# Patient Record
Sex: MALE | Race: WHITE
[De-identification: names, ages, dates, MRNs, and addresses within clinical notes are randomized per-mention and may not be internally consistent; named-entity substitution may affect disease eponyms.]

---

## 2017-03-06 ENCOUNTER — HOSPITAL ENCOUNTER (EMERGENCY)
Dept: HOSPITAL 10 - E/R | Age: 82
LOS: 1 days | Discharge: LEFT BEFORE DISCHARGE | End: 2017-03-07
Payer: MEDICARE

## 2017-03-06 VITALS
HEIGHT: 68 IN | HEIGHT: 68 IN | WEIGHT: 155.32 LBS | BODY MASS INDEX: 23.54 KG/M2 | BODY MASS INDEX: 23.54 KG/M2 | WEIGHT: 155.32 LBS

## 2017-03-06 DIAGNOSIS — T07: ICD-10-CM

## 2017-03-06 DIAGNOSIS — R41.82: ICD-10-CM
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
## 2017-03-06 DIAGNOSIS — I48.91: ICD-10-CM

## 2017-03-06 DIAGNOSIS — R53.1: Primary | ICD-10-CM

## 2017-03-06 LAB
ADD SCAN DIFF: NO
ALBUMIN SERPL-MCNC: 4.7 G/DL (ref 3.3–4.9)
ALBUMIN/GLOB SERPL: 1.27 {RATIO}
ALP SERPL-CCNC: 74 IU/L (ref 42–121)
ALT SERPL-CCNC: 63 IU/L (ref 13–69)
ANION GAP SERPL CALC-SCNC: 20 MMOL/L (ref 8–16)
AST SERPL-CCNC: 72 IU/L (ref 15–46)
BASOPHILS # BLD AUTO: 0 10^3/UL (ref 0–0.1)
BASOPHILS NFR BLD: 0.4 % (ref 0–2)
BILIRUB DIRECT SERPL-MCNC: 0 MG/DL (ref 0–0.2)
BILIRUB SERPL-MCNC: 0.4 MG/DL (ref 0.2–1.3)
BUN SERPL-MCNC: 21 MG/DL (ref 7–20)
CALCIUM SERPL-MCNC: 9.6 MG/DL (ref 8.4–10.2)
CHLORIDE SERPL-SCNC: 99 MMOL/L (ref 97–110)
CO2 SERPL-SCNC: 25 MMOL/L (ref 21–31)
CREAT SERPL-MCNC: 1.01 MG/DL (ref 0.61–1.24)
EOSINOPHIL # BLD: 0.1 10^3/UL (ref 0–0.5)
EOSINOPHIL NFR BLD: 1.2 % (ref 0–7)
ERYTHROCYTE [DISTWIDTH] IN BLOOD BY AUTOMATED COUNT: 12.8 % (ref 11.5–14.5)
GLOBULIN SER-MCNC: 3.7 G/DL (ref 1.3–3.2)
GLUCOSE SERPL-MCNC: 134 MG/DL (ref 70–220)
HCT VFR BLD CALC: 50.1 % (ref 42–52)
HGB BLD-MCNC: 16.7 G/DL (ref 14–18)
LYMPHOCYTES # BLD AUTO: 0.7 10^3/UL (ref 0.8–2.9)
LYMPHOCYTES NFR BLD AUTO: 8.8 % (ref 15–51)
MCH RBC QN AUTO: 31.3 PG (ref 29–33)
MCHC RBC AUTO-ENTMCNC: 33.3 G/DL (ref 32–37)
MCV RBC AUTO: 94 FL (ref 82–101)
MONOCYTES # BLD: 1.1 10^3/UL (ref 0.3–0.9)
MONOCYTES NFR BLD: 12.7 % (ref 0–11)
NEUTROPHILS # BLD: 6.4 10^3/UL (ref 1.6–7.5)
NEUTROPHILS NFR BLD AUTO: 76.4 % (ref 39–77)
NRBC # BLD MANUAL: 0 10^3/UL (ref 0–0)
NRBC BLD QL: 0 /100WBC (ref 0–0)
PLATELET # BLD: 186 10^3/UL (ref 140–415)
PMV BLD AUTO: 9.6 FL (ref 7.4–10.4)
POTASSIUM SERPL-SCNC: 4.5 MMOL/L (ref 3.5–5.1)
PROT SERPL-MCNC: 8.4 G/DL (ref 6.1–8.1)
RBC # BLD AUTO: 5.33 10^6/UL (ref 4.7–6.1)
SODIUM SERPL-SCNC: 139 MMOL/L (ref 135–144)
TROPONIN I SERPL-MCNC: 0.01 NG/ML (ref 0–0.12)
WBC # BLD AUTO: 8.3 10^3/UL (ref 4.8–10.8)

## 2017-03-06 PROCEDURE — 84484 ASSAY OF TROPONIN QUANT: CPT

## 2017-03-06 PROCEDURE — 80053 COMPREHEN METABOLIC PANEL: CPT

## 2017-03-06 PROCEDURE — 93005 ELECTROCARDIOGRAM TRACING: CPT

## 2017-03-06 PROCEDURE — 71010: CPT

## 2017-03-06 PROCEDURE — 36415 COLL VENOUS BLD VENIPUNCTURE: CPT

## 2017-03-06 PROCEDURE — 83690 ASSAY OF LIPASE: CPT

## 2017-03-06 PROCEDURE — 70450 CT HEAD/BRAIN W/O DYE: CPT

## 2017-03-06 PROCEDURE — 82962 GLUCOSE BLOOD TEST: CPT

## 2017-03-06 PROCEDURE — 85025 COMPLETE CBC W/AUTO DIFF WBC: CPT

## 2017-03-06 NOTE — ERD
ER Documentation


Chief Complaint


Date/Time


DATE: 3/6/17 


TIME: 23:58


Chief Complaint


s/p fall X3 within 24-hour period,leaning towards left side,denies HA





HPI


This is an 82-year-old male has fallen 3 times the past 24 hours secondary to 

weakness.  Patient denies any headache or any focal neurologic complaints.  

Patient has history of old strokes in the past.  Denies any fevers or chills.  

Denies any chest pain.  Denies any other current issues.





ROS


All systems reviewed and are negative except as per history of present illness.





Medications


Home Meds


Reported Medications


[Ultra Supplements]   No Conflict Check, 1 TAB PO DAILY


   3/6/17


Cholecalciferol* (Vitamin D3*) 1,000 Unit Tablet, 1000 UNIT PO DAILY, TAB


   3/6/17


Mirabegron (Myrbetriq) 50 Mg Tab.er.24h, 25 MG PO DAILY, TAB


   3/6/17


Herbal Drugs (Colon Herbal Cleanser) 1 Each Capsule, 1 EACH PO DAILY, CAP


   3/6/17


Aspirin* (Aspirin* EC) 81 Mg Tablet.dr, 81 MG PO DAILY, TAB


   3/6/17


Celecoxib* (Celebrex*) 100 Mg Capsule, 100 MG PO BID, CAP


   3/6/17


Ezetimibe/Simvastatin (Vytorin 10-20 mg Tablet) 1 Each Tablet, 1 EACH PO DAILY, 

TAB


   3/6/17


Amlodipine Besylate* (Amlodipine Besylate*) 10 Mg Tablet, 10 MG PO DAILY, #30 

TAB


   3/6/17


Metoprolol Succinate* (Toprol XL*) 100 Mg Tab.sr.24h, 100 MG PO DAILY, #30 TAB


   3/6/17





Allergies


Allergies:  


Coded Allergies:  


     No Known Allergy (Unverified , 3/6/17)





PMhx/Soc


History of Surgery:  Yes (patient states about 10 surgeries, cant recall what)


Anesthesia Reaction:  No


Hx Neurological Disorder:  Yes (stroke)


Hx Respiratory Disorders:  No


Hx Psychiatric Problems:  No


Hx Miscellaneous Medical Probl:  Yes (prostate issues)


Hx Alcohol Use:  No


Hx Substance Use:  No


Hx Tobacco Use:  No


Smoking Status:  Never smoker





Physical Exam


Vitals





Vital Signs








  Date Time  Temp Pulse Resp B/P Pulse Ox O2 Delivery O2 Flow Rate FiO2


 


3/6/17 21:26  86 25 147/93 96 Room Air  


 


3/6/17 21:17 98.2 86 21 147/93 97   








Physical Exam


Const:  []


Head:   Atraumatic 


Eyes:    Normal Conjunctiva


ENT:    Normal External Ears, Nose and Mouth.


Neck:               Full range of motion..~ No meningismus.


Resp:    Clear to auscultation bilaterally


Cardio:    Regular rate and rhythm, no murmurs


Abd:    Soft, non tender, non distended. Normal bowel sounds


Skin:    No petechiae or rashes


Back:    No midline or flank tenderness


Ext:    No cyanosis, or edema


Neur:    Awake and alert


Psych:    Normal Mood and Affect


Result Diagram:  


3/6/17 2145                                                                    

            3/6/17 2145





Results 24 hrs





 Laboratory Tests








Test


  3/6/17


21:15 3/6/17


21:45


 


Bedside Glucose 137mg/dL  


 


Alanine Aminotransferase


(ALT/SGPT) 


  63IU/L 


 


 


Albumin  4.7g/dl 


 


Albumin/Globulin Ratio  1.27 


 


Alkaline Phosphatase  74IU/L 


 


Anion Gap  20 


 


Aspartate Amino Transf


(AST/SGOT) 


  72IU/L 


 


 


Basophils #  0.010^3/ul 


 


Basophils %  0.4% 


 


Blood Urea Nitrogen  21mg/dl 


 


Calcium Level  9.6mg/dl 


 


Carbon Dioxide Level  25mmol/L 


 


Chloride Level  99mmol/L 


 


Creatinine  1.01mg/dl 


 


Direct Bilirubin  0.00mg/dl 


 


Eosinophils #  0.110^3/ul 


 


Eosinophils %  1.2% 


 


Globulin  3.70g/dl 


 


Glucose Level  134mg/dl 


 


Hematocrit  50.1% 


 


Hemoglobin  16.7g/dl 


 


Indirect Bilirubin  0.4mg/dl 


 


Lipase  49U/L 


 


Lymphocytes #  0.710^3/ul 


 


Lymphocytes %  8.8% 


 


Mean Corpuscular Hemoglobin  31.3pg 


 


Mean Corpuscular Hemoglobin


Concent 


  33.3g/dl 


 


 


Mean Corpuscular Volume  94.0fl 


 


Mean Platelet Volume  9.6fl 


 


Monocytes #  1.110^3/ul 


 


Monocytes %  12.7% 


 


Neutrophils #  6.410^3/ul 


 


Neutrophils %  76.4% 


 


Nucleated Red Blood Cells #  0.010^3/ul 


 


Nucleated Red Blood Cells %  0.0/100WBC 


 


Platelet Count  49243^3/UL 


 


Potassium Level  4.5mmol/L 


 


Red Blood Count  5.3310^6/ul 


 


Red Cell Distribution Width  12.8% 


 


Sodium Level  139mmol/L 


 


Total Bilirubin  0.4mg/dl 


 


Total Protein  8.4g/dl 


 


Troponin I  0.014ng/ml 


 


White Blood Count  8.310^3/ul 








 Current Medications








 Medications


  (Trade)  Dose


 Ordered  Sig/Roshan


 Route


 PRN Reason  Start Time


 Stop Time Status Last Admin


Dose Admin


 


 Hydrocodone Bit/


 Homatropine


 Methylb


  (Hycodan Liquid)  10 ml  ONCE  ONCE


 PO


   3/7/17 00:00


 3/7/17 00:01   


 


 


 Acetaminophen/


 Codeine Phosphate


  (Tylenol/Codeine


 Liquid)  10 ml  ONCE  ONCE


 PO


   3/7/17 00:00


 3/7/17 00:01  3/6/17 23:53


 











Procedures/MDM


EKG: 


Rate/Rhythm:             Variable NC and QT intervals.  Heart rate variable


QRS, ST, T-waves:    No changes consistent w/ acute ischemia


Impression:      Atrial fibrillation





Chest X-ray 1V Interpreted by me:


Soft Tissue:                                               No acute 

abnormalities


Bones:                                                    No acute abnormalities


Mediastinum/Cardiac Silhouette/Lungs:     No acute abnormalities





Medical decision-making: A 2-year-old gentleman with what looks to be new onset 

A. fib.  Patient will be admitted to telemetry to hospitalist.





Departure


Diagnosis:  


 Primary Impression:  


 Acute weakness


 Additional Impression:  


 Atrial fibrillation, new onset


Condition:  Serious











JONA SARABIA Mar 6, 2017 23:59

## 2017-03-06 NOTE — RADRPT
PROCEDURE:   XR Chest. 

 

CLINICAL INDICATION:    Abdominal pain.

 

TECHNIQUE:   Single frontal view  of the chest was obtained 

 

COMPARISON:   None 

 

FINDINGS:

Cardiomegaly and atherosclerotic calcifications in the thoracic aorta.  Mild attenuation at the left
 lung base likely represents overlying soft tissues.  The lungs are otherwise clear.

There is no pleural effusion or pneumothorax.   

 

 

IMPRESSION:

No acute disease. 

 

 

RPTAT: UU

_____________________________________________ 

Physician Dakotah           Date    Time 

Electronically viewed and signed by Physician Dakotah on 03/06/2017 22:14 

 

D:  03/06/2017 22:14  T:  03/06/2017 22:14

RS/

## 2017-03-06 NOTE — RADRPT
PROCEDURE:   CT Head without contrast. 

 

CLINICAL INDICATION:   Altered mental status

 

TECHNIQUE:   The study was performed utilizing a GE 64-slice multidetector CT scanner. Direct spiral
 axial CT images of the brain were obtained from the vertex to the skull base without contrast. Kenny
nal and sagittal reformatted images are provided. The CTDI vol is 123.13 mGy and the DLP is 749.79 m
Gy-cm. The images were reviewed on a PACS workstation. 

 

COMPARISON:   No prior studies are available for comparison. 

 

FINDINGS:

Exam is limited secondary to motion artifact.  Moderate diffuse atrophy is seen with a compensatory 
ventricular enlargement.  Mild to moderate white matter disease in the periventricular and deep whit
e matter is seen. Small chronic lacunar infarcts are seen in the left basal ganglia and right cerebe
llar hemisphere.  The gray-white matter differentiation is maintained.  No intra or extra-axial flui
d collection or mass effect or shift in the midline structures is seen.  The visualized paranasal si
nuses, mastoid air cells, orbits, and calvarium are unremarkable. Vascular calcifications are seen.

 

IMPRESSION:

 

1.  Limited examination secondary to motion artifact.  Otherwise, no acute intracranial pathology.

2.  Moderate diffuse volume loss and mild to moderate chronic microvascular ischemic changes. 

3.  Small left basal ganglia and right cerebellar hemisphere lacunar infarcts.

 

RPTAT: HPNM

_____________________________________________ 

Physician Gavin           Date    Time 

Electronically viewed and signed by Physician Gavin on 03/06/2017 22:24 

 

D:  03/06/2017 22:24  T:  03/06/2017 22:24

PM/

## 2017-03-07 VITALS
TEMPERATURE: 97.7 F | DIASTOLIC BLOOD PRESSURE: 91 MMHG | HEART RATE: 87 BPM | SYSTOLIC BLOOD PRESSURE: 130 MMHG | RESPIRATION RATE: 26 BRPM

## 2017-03-07 NOTE — HP
Date/Time of Note


Date/Time of Note


DATE: 3/7/17 


TIME: 00:11





Assessment/Plan


VTE Prophylaxis


VTE Prophylaxis Intervention:  other (Patient left AMA.)





HPI/ROS


Admit Date/Time


Admit Date/Time








Hx of Present Illness


I opened this note prior to going to the ER to see patient, but he left AMA 

before I ever saw him.





PMH/Family/Social


Social History


Smoking Status:  Never smoker





Exam/Review of Systems


Vital Signs


Vitals





 Vital Signs








  Date Time  Temp Pulse Resp B/P Pulse Ox O2 Delivery O2 Flow Rate FiO2


 


3/6/17 21:26  86 25 147/93 96 Room Air  


 


3/6/17 21:17 98.2       











Labs


Result Diagram:  


3/6/17 2145                                                                    

            3/6/17 2145














AASHISH BIRMINGHAM MD Mar 7, 2017 00:12

## 2017-11-08 ENCOUNTER — HOSPITAL ENCOUNTER (INPATIENT)
Dept: HOSPITAL 10 - E/R | Age: 82
LOS: 6 days | Discharge: NURSING, REHAB OR LONG-TERM CARE | DRG: 871 | End: 2017-11-14
Attending: INTERNAL MEDICINE | Admitting: INTERNAL MEDICINE
Payer: MEDICARE

## 2017-11-08 VITALS
HEIGHT: 67 IN | BODY MASS INDEX: 30.8 KG/M2 | BODY MASS INDEX: 30.8 KG/M2 | WEIGHT: 196.21 LBS | WEIGHT: 196.21 LBS | HEIGHT: 67 IN

## 2017-11-08 VITALS — TEMPERATURE: 100.8 F

## 2017-11-08 DIAGNOSIS — G93.41: ICD-10-CM

## 2017-11-08 DIAGNOSIS — R53.81: ICD-10-CM

## 2017-11-08 DIAGNOSIS — H10.89: ICD-10-CM

## 2017-11-08 DIAGNOSIS — N39.0: ICD-10-CM

## 2017-11-08 DIAGNOSIS — E78.5: ICD-10-CM

## 2017-11-08 DIAGNOSIS — I10: ICD-10-CM

## 2017-11-08 DIAGNOSIS — Z79.82: ICD-10-CM

## 2017-11-08 DIAGNOSIS — R22.43: ICD-10-CM

## 2017-11-08 DIAGNOSIS — Z86.73: ICD-10-CM

## 2017-11-08 DIAGNOSIS — A41.9: Primary | ICD-10-CM

## 2017-11-08 LAB
ADD UMIC: YES
ALBUMIN SERPL-MCNC: 4.8 G/DL (ref 3.3–4.9)
ALBUMIN/GLOB SERPL: 1.37 {RATIO}
ALP SERPL-CCNC: 90 IU/L (ref 42–121)
ALT SERPL-CCNC: 39 IU/L (ref 13–69)
ANION GAP SERPL CALC-SCNC: 17 MMOL/L (ref 8–16)
APTT BLD: 29.6 SEC (ref 25–35)
AST SERPL-CCNC: 40 IU/L (ref 15–46)
BACTERIA #/AREA URNS HPF: (no result) /HPF
BASOPHILS # BLD AUTO: 0.1 10^3/UL (ref 0–0.1)
BASOPHILS NFR BLD: 0.4 % (ref 0–2)
BILIRUB DIRECT SERPL-MCNC: 0 MG/DL (ref 0–0.2)
BILIRUB SERPL-MCNC: 0.4 MG/DL (ref 0.2–1.3)
BUN SERPL-MCNC: 16 MG/DL (ref 7–20)
CALCIUM SERPL-MCNC: 9.7 MG/DL (ref 8.4–10.2)
CHLORIDE SERPL-SCNC: 103 MMOL/L (ref 97–110)
CO2 SERPL-SCNC: 26 MMOL/L (ref 21–31)
COLOR UR: YELLOW
CREAT SERPL-MCNC: 1.01 MG/DL (ref 0.61–1.24)
EOSINOPHIL # BLD: 0.1 10^3/UL (ref 0–0.5)
EOSINOPHIL NFR BLD: 0.8 % (ref 0–7)
ERYTHROCYTE [DISTWIDTH] IN BLOOD BY AUTOMATED COUNT: 12.2 % (ref 11.5–14.5)
GLOBULIN SER-MCNC: 3.5 G/DL (ref 1.3–3.2)
GLUCOSE SERPL-MCNC: 133 MG/DL (ref 70–220)
GLUCOSE UR STRIP-MCNC: NEGATIVE MG/DL
HCT VFR BLD CALC: 49 % (ref 42–52)
HGB BLD-MCNC: 16.8 G/DL (ref 14–18)
INR PPP: 0.91
KETONES UR STRIP.AUTO-MCNC: (no result) MG/DL
LYMPHOCYTES # BLD AUTO: 1.5 10^3/UL (ref 0.8–2.9)
LYMPHOCYTES NFR BLD AUTO: 9.3 % (ref 15–51)
MCH RBC QN AUTO: 31.9 PG (ref 29–33)
MCHC RBC AUTO-ENTMCNC: 34.3 G/DL (ref 32–37)
MCV RBC AUTO: 93 FL (ref 82–101)
MONOCYTES # BLD: 0.9 10^3/UL (ref 0.3–0.9)
MONOCYTES NFR BLD: 5.5 % (ref 0–11)
NEUTROPHILS # BLD: 13.5 10^3/UL (ref 1.6–7.5)
NEUTROPHILS NFR BLD AUTO: 83.6 % (ref 39–77)
NITRITE UR QL STRIP.AUTO: NEGATIVE MG/DL
NRBC # BLD MANUAL: 0 10^3/UL (ref 0–0)
NRBC BLD AUTO-RTO: 0 /100WBC (ref 0–0)
PLATELET # BLD: 222 10^3/UL (ref 140–415)
PMV BLD AUTO: 9.5 FL (ref 7.4–10.4)
POTASSIUM SERPL-SCNC: 4.4 MMOL/L (ref 3.5–5.1)
PROT SERPL-MCNC: 8.3 G/DL (ref 6.1–8.1)
PROTHROMBIN TIME: 12.2 SEC (ref 12.2–14.2)
PT RATIO: 1
RBC # BLD AUTO: 5.27 10^6/UL (ref 4.7–6.1)
RBC # UR AUTO: (no result) MG/DL
SODIUM SERPL-SCNC: 142 MMOL/L (ref 135–144)
SQUAMOUS #/AREA URNS HPF: (no result) /HPF
TROPONIN I SERPL-MCNC: < 0.012 NG/ML (ref 0–0.12)
UR ASCORBIC ACID: NEGATIVE MG/DL
UR BILIRUBIN (DIP): NEGATIVE MG/DL
UR CLARITY: (no result)
UR PH (DIP): 5 (ref 5–9)
UR RBC: 21 /HPF (ref 0–5)
UR SPECIFIC GRAVITY (DIP): 1.02 (ref 1–1.03)
UR TOTAL PROTEIN (DIP): (no result) MG/DL
UROBILINOGEN UR STRIP-ACNC: NEGATIVE MG/DL
WBC # BLD AUTO: 16.2 10^3/UL (ref 4.8–10.8)
WBC # UR STRIP: (no result) LEU/UL

## 2017-11-08 PROCEDURE — 84484 ASSAY OF TROPONIN QUANT: CPT

## 2017-11-08 PROCEDURE — 83605 ASSAY OF LACTIC ACID: CPT

## 2017-11-08 PROCEDURE — 87040 BLOOD CULTURE FOR BACTERIA: CPT

## 2017-11-08 PROCEDURE — 80053 COMPREHEN METABOLIC PANEL: CPT

## 2017-11-08 PROCEDURE — P9047 ALBUMIN (HUMAN), 25%, 50ML: HCPCS

## 2017-11-08 PROCEDURE — 80048 BASIC METABOLIC PNL TOTAL CA: CPT

## 2017-11-08 PROCEDURE — 84100 ASSAY OF PHOSPHORUS: CPT

## 2017-11-08 PROCEDURE — 83735 ASSAY OF MAGNESIUM: CPT

## 2017-11-08 PROCEDURE — A4310 INSERT TRAY W/O BAG/CATH: HCPCS

## 2017-11-08 PROCEDURE — 71010: CPT

## 2017-11-08 PROCEDURE — 97530 THERAPEUTIC ACTIVITIES: CPT

## 2017-11-08 PROCEDURE — 85025 COMPLETE CBC W/AUTO DIFF WBC: CPT

## 2017-11-08 PROCEDURE — 36415 COLL VENOUS BLD VENIPUNCTURE: CPT

## 2017-11-08 PROCEDURE — 93306 TTE W/DOPPLER COMPLETE: CPT

## 2017-11-08 PROCEDURE — 87086 URINE CULTURE/COLONY COUNT: CPT

## 2017-11-08 PROCEDURE — 83880 ASSAY OF NATRIURETIC PEPTIDE: CPT

## 2017-11-08 PROCEDURE — 97163 PT EVAL HIGH COMPLEX 45 MIN: CPT

## 2017-11-08 PROCEDURE — 96374 THER/PROPH/DIAG INJ IV PUSH: CPT

## 2017-11-08 PROCEDURE — 93005 ELECTROCARDIOGRAM TRACING: CPT

## 2017-11-08 PROCEDURE — 81001 URINALYSIS AUTO W/SCOPE: CPT

## 2017-11-08 PROCEDURE — 87045 FECES CULTURE AEROBIC BACT: CPT

## 2017-11-08 PROCEDURE — 70450 CT HEAD/BRAIN W/O DYE: CPT

## 2017-11-08 PROCEDURE — 85610 PROTHROMBIN TIME: CPT

## 2017-11-08 PROCEDURE — 74176 CT ABD & PELVIS W/O CONTRAST: CPT

## 2017-11-08 PROCEDURE — 97110 THERAPEUTIC EXERCISES: CPT

## 2017-11-08 PROCEDURE — 85730 THROMBOPLASTIN TIME PARTIAL: CPT

## 2017-11-08 PROCEDURE — 96375 TX/PRO/DX INJ NEW DRUG ADDON: CPT

## 2017-11-08 NOTE — RADRPT
PROCEDURE:   CT Abdomen and Pelvis without contrast. 

 

CLINICAL INDICATION:    Abdominal pain

 

TECHNIQUE:   CT of the abdomen and pelvis was performed on a multi-detector scanner without IV contr
ast.  Coronal and sagittal images were reformatted from the axial data set.  One or more of the foll
owing dose reduction techniques were used: automated exposure control, adjustment of the mA and/or k
V according to patient size, use of iterative reconstruction technique.  CTDI = 18.76 mGy. DLP = 117
8.35 mGy-cm.

 

COMPARISON:   None. 

 

FINDINGS:

 

The lung bases are clear.  The heart size is normal, without pericardial effusion.  Coronary arteria
l calcifications are noted. The liver is fatty infiltrated, without evidence of focal mass. Gallblad
frank, biliary tree, pancreas, spleen, adrenal glands and kidneys are unremarkable. No urolithiasis or
 obstructive uropathy is identified. Small hiatal hernia is noted. The stomach is otherwise grossly 
unremarkable. 

 

The aorta is of normal caliber.  Aortic vascular calcifications are present.  There is no retroperit
king lymphadenopathy.  The liam hepatis region is clear.  

 

No bowel obstruction, free intraperitoneal air or abscess is identified. Sigmoid diverticulosis is s
een without diverticulitis. The appendix is well visualized and normal. There is no colitis. Urinary
 bladder is grossly unremarkable. No pelvic mass, free fluid or lymphadenopathy is identified.

 

The surrounding osseous structures are remarkable for degenerative enthesopathy of the spine.  No os
teolytic or osteoblastic lesion is detected.  

 

IMPRESSION:

 

1.  Diffuse coronary arterial and aortoiliac atherosclerotic calcification is present.

2.  Hepatic steatosis is noted.

3.  Small hiatal hernia is identified.

4.  Sigmoid diverticulosis is seen without diverticulitis.

5.  No mass, lymphadenopathy, or focal acute inflammatory process is identified.

 

RPTAT: HDWR

_____________________________________________ 

.Samuel Miguel MD, MD           Date    Time 

Electronically viewed and signed by .Samuel Miguel MD, MD on 11/08/2017 20:52 

 

D:  11/08/2017 20:52  T:  11/08/2017 20:52

.R/

## 2017-11-08 NOTE — RADRPT
PROCEDURE:   XR Chest. 

 

CLINICAL INDICATION:   Possible Sepsis 

 

TECHNIQUE:   Single frontal view of the chest.

 

COMPARISON:   Chest radiograph dated March 6, 2017. 

 

FINDINGS:

 

The cardiomediastinal silhouette is within normal limits. Aortic calcifications are present. 

 

There are no definite focal consolidations. There is obscuration of the left hemidiaphragm.

 

There are degenerative changes of the spine and shoulder joints.

 

IMPRESSION:

 

1.  Obscuration of the left hemidiaphragm may be due to a small left pleural effusion.

 

2.  No focal consolidations.

 

RPTAT:AAJJ

_____________________________________________ 

Physician Quinn           Date    Time 

Electronically viewed and signed by Physician Quinn on 11/08/2017 20:12 

 

D:  11/08/2017 20:12  T:  11/08/2017 20:12

QL/

## 2017-11-08 NOTE — ERD
ER Documentation


Chief Complaint


Chief Complaint


BIBA  from home,c/o generalized weakness





HPI


Patient is an 83-year-old male who presents confused.  Please note the history 

and physical exam is limited secondary to the patient's confusion.  The patient 

was brought in by ambulance.  He says that he has "fluid".  He has bilateral 

lower extremity edema.  He has frequent urination.  He denies fevers.  He has 

had cough but no shortness of breath.  He does not remember the name of his 

primary doctor.  Upon review of old medical records this is the patient's third 

visit to the ER since 2006.





ROS


All systems reviewed and are negative except as per history of present illness.





Medications


Home Meds


Reported Medications


Cholecalciferol* (Vitamin D3*) 1,000 Unit Tablet, 1000 UNIT PO DAILY, TAB


   3/6/17


Mirabegron (Myrbetriq) 50 Mg Tab.er.24h, 25 MG PO DAILY, TAB


   3/6/17


Herbal Drugs (Colon Herbal Cleanser) 1 Each Capsule, 1 EACH PO DAILY, CAP


   3/6/17


Aspirin* (Aspirin* EC) 81 Mg Tablet.dr, 81 MG PO DAILY, TAB


   3/6/17


Celecoxib* (Celebrex*) 100 Mg Capsule, 100 MG PO BID, CAP


   3/6/17


Ezetimibe/Simvastatin (Vytorin 10-20 mg Tablet) 1 Each Tablet, 1 EACH PO DAILY, 

TAB


   3/6/17


Amlodipine Besylate* (Amlodipine Besylate*) 10 Mg Tablet, 10 MG PO DAILY, #30 

TAB


   3/6/17


Metoprolol Succinate* (Toprol XL*) 100 Mg Tab.sr.24h, 100 MG PO DAILY, #30 TAB


   3/6/17


Discontinued Reported Medications


[Ultra Supplements]   No Conflict Check, 1 TAB PO DAILY


   3/6/17





Allergies


Allergies:  


Coded Allergies:  


     No Known Allergy (Unverified , 3/6/17)





PMhx/Soc


History of Surgery:  Yes (patient states about 10 surgeries, cant recall what)


Anesthesia Reaction:  No


Hx Neurological Disorder:  Yes (stroke)


Hx Respiratory Disorders:  No


Hx Psychiatric Problems:  No


Hx Miscellaneous Medical Probl:  Yes (prostate issues)


Hx Alcohol Use:  No


Hx Substance Use:  No


Hx Tobacco Use:  No


Smoking Status:  Never smoker





FmHx


Family History:  No diabetes





Physical Exam


Vitals





Vital Signs








  Date Time  Temp Pulse Resp B/P Pulse Ox O2 Delivery O2 Flow Rate FiO2


 


11/8/17 19:06 100.8 103 18 163/91 96   








Physical Exam


Const: Confused


Head:   Atraumatic 


Eyes:    Normal Conjunctiva


ENT:    Normal External Ears, Nose and Mouth.


Neck:               Full range of motion..~ No meningismus.


Resp:    Clear to auscultation bilaterally


Cardio:    Regular rate and rhythm, no murmurs


Abd:    Soft, non tender, non distended. Normal bowel sounds


Skin:    No petechiae or rashes


Back:    No midline or flank tenderness


Ext:    Bilateral lower extremity edema


Neur:    Awake but confused


Result Diagram:  


11/8/17 1905 11/8/17 1905





Results 24 hrs





 Laboratory Tests








Test


  11/8/17


19:05 11/8/17


21:00 11/8/17


22:00


 


White Blood Count 16.210^3/ul   


 


Red Blood Count 5.2710^6/ul   


 


Hemoglobin 16.8g/dl   


 


Hematocrit 49.0%   


 


Mean Corpuscular Volume 93.0fl   


 


Mean Corpuscular Hemoglobin 31.9pg   


 


Mean Corpuscular Hemoglobin


Concent 34.3g/dl 


  


  


 


 


Red Cell Distribution Width 12.2%   


 


Platelet Count 95780^3/UL   


 


Mean Platelet Volume 9.5fl   


 


Neutrophils % 83.6%   


 


Lymphocytes % 9.3%   


 


Monocytes % 5.5%   


 


Eosinophils % 0.8%   


 


Basophils % 0.4%   


 


Nucleated Red Blood Cells % 0.0/100WBC   


 


Neutrophils # 13.510^3/ul   


 


Lymphocytes # 1.510^3/ul   


 


Monocytes # 0.910^3/ul   


 


Eosinophils # 0.110^3/ul   


 


Basophils # 0.110^3/ul   


 


Nucleated Red Blood Cells # 0.010^3/ul   


 


Prothrombin Time 12.2Sec   


 


Prothrombin Time Ratio 1.0   


 


INR International Normalized


Ratio 0.91 


  


  


 


 


Activated Partial


Thromboplast Time 29.6Sec 


  


  


 


 


Sodium Level 142mmol/L   


 


Potassium Level 4.4mmol/L   


 


Chloride Level 103mmol/L   


 


Carbon Dioxide Level 26mmol/L   


 


Anion Gap 17   


 


Blood Urea Nitrogen 16mg/dl   


 


Creatinine 1.01mg/dl   


 


Glucose Level 133mg/dl   


 


Lactic Acid Level 1.9mmol/L  1.6mmol/L  


 


Calcium Level 9.7mg/dl   


 


Total Bilirubin 0.4mg/dl   


 


Direct Bilirubin 0.00mg/dl   


 


Indirect Bilirubin 0.4mg/dl   


 


Aspartate Amino Transf


(AST/SGOT) 40IU/L 


  


  


 


 


Alanine Aminotransferase


(ALT/SGPT) 39IU/L 


  


  


 


 


Alkaline Phosphatase 90IU/L   


 


Troponin I < 0.012ng/ml   


 


Total Protein 8.3g/dl   


 


Albumin 4.8g/dl   


 


Globulin 3.50g/dl   


 


Albumin/Globulin Ratio 1.37   


 


Urine Color   YELLOW 


 


Urine Clarity   CLOUDY 


 


Urine pH   5.0 


 


Urine Specific Gravity   1.023 


 


Urine Ketones   TRACEmg/dL 


 


Urine Nitrite   NEGATIVEmg/dL 


 


Urine Bilirubin   NEGATIVEmg/dL 


 


Urine Urobilinogen   NEGATIVEmg/dL 


 


Urine Leukocyte Esterase   3+Maria A/ul 


 


Urine Microscopic RBC   21/HPF 


 


Urine Microscopic WBC   > 182/HPF 


 


Urine Squamous Epithelial


Cells 


  


  FEW/HPF 


 


 


Urine Bacteria   FEW/HPF 


 


Urine Hemoglobin   2+mg/dL 


 


Urine Glucose   NEGATIVEmg/dL 


 


Urine Total Protein   1+mg/dl 








 Current Medications








 Medications


  (Trade)  Dose


 Ordered  Sig/Roshan


 Route


 PRN Reason  Start Time


 Stop Time Status Last Admin


Dose Admin


 


 Cefepime HCl  50 ml @ 


 100 mls/hr  ONCE  STAT


 IVPB


   11/8/17 19:06


 11/8/17 19:35 DC 11/8/17 19:39


 


 


 Vancomycin HCl


  (Vancocin)  250 ml @ 


 125 mls/hr  ONCE  ONCE


 IVPB


   11/8/17 19:30


 11/8/17 21:29 DC 11/8/17 19:30


 


 


 Acetaminophen


  (Tylenol Tab)  650 mg  ONCE  ONCE


 PO


   11/8/17 19:30


 11/8/17 19:31 DC 11/8/17 19:37


 


 


 Morphine Sulfate


  (morphine)  4 mg  ONCE  STAT


 IV


   11/8/17 19:11


 11/8/17 19:12 DC 11/8/17 19:36


 


 


 Ondansetron HCl


  (Zofran Inj)  4 mg  ONCE  STAT


 IV


   11/8/17 19:11


 11/8/17 19:12 DC 11/8/17 19:36


 


 


 Sodium Chloride


  (NS)  2,250 ml  BOLUS OVER 2 HOURS STAT


 IV*


   11/8/17 19:11


 11/8/17 19:42 DC 11/8/17 19:36


 


 


 Ondansetron HCl


  (Zofran Inj)  4 mg  BRIDGE ORDER PRN


 IV


 NAUSEA AND/OR VOMITING  11/8/17 22:00


 11/9/17 21:59   


 


 


 Acetaminophen


  (Tylenol Tab)  650 mg  ER BRIDGE PRN


 PO


 MILD PAIN/FEVER  11/8/17 22:00


 11/9/17 21:59   


 











Procedures/MDM


CT abdomen and pelvis is negative for intra-abdominal process per radiology.





Patient is an 83-year-old male presents with confusion.  The patient was found 

to have acute cystitis which I believe is likely the cause of his confusion.  

He also has fluid on the lower extremities bilaterally.  The patient was given 

broad-spectrum antibiotics.  I doubt sepsis or septic shock.  The patient will 

need admission to the hospital for continued antibiotics given his age and 

confusion.  The patient will be admitted to Dr. Schwartz from the panel team to a 

medical surgical bed.  Other laboratory studies were normal.  Lactic acid was 

normal.





Departure


Diagnosis:  


 Primary Impression:  


 Cystitis


 Additional Impressions:  


 Acute weakness


 Confusion


Condition:  NAOMI William MD Nov 8, 2017 22:51

## 2017-11-09 VITALS — SYSTOLIC BLOOD PRESSURE: 125 MMHG | DIASTOLIC BLOOD PRESSURE: 60 MMHG | RESPIRATION RATE: 18 BRPM

## 2017-11-09 VITALS — DIASTOLIC BLOOD PRESSURE: 58 MMHG | SYSTOLIC BLOOD PRESSURE: 119 MMHG | RESPIRATION RATE: 20 BRPM

## 2017-11-09 VITALS — SYSTOLIC BLOOD PRESSURE: 135 MMHG | DIASTOLIC BLOOD PRESSURE: 66 MMHG | RESPIRATION RATE: 19 BRPM

## 2017-11-09 VITALS — RESPIRATION RATE: 18 BRPM | DIASTOLIC BLOOD PRESSURE: 63 MMHG | SYSTOLIC BLOOD PRESSURE: 135 MMHG

## 2017-11-09 VITALS — RESPIRATION RATE: 18 BRPM | DIASTOLIC BLOOD PRESSURE: 81 MMHG | HEART RATE: 85 BPM | SYSTOLIC BLOOD PRESSURE: 135 MMHG

## 2017-11-09 LAB
ALBUMIN SERPL-MCNC: 3.8 G/DL (ref 3.3–4.9)
ALBUMIN/GLOB SERPL: 1.4 {RATIO}
ALP SERPL-CCNC: 56 IU/L (ref 42–121)
ALT SERPL-CCNC: 32 IU/L (ref 13–69)
ANION GAP SERPL CALC-SCNC: 14 MMOL/L (ref 8–16)
AST SERPL-CCNC: 23 IU/L (ref 15–46)
BASOPHILS # BLD AUTO: 0.1 10^3/UL (ref 0–0.1)
BASOPHILS NFR BLD: 0.3 % (ref 0–2)
BILIRUB DIRECT SERPL-MCNC: 0 MG/DL (ref 0–0.2)
BILIRUB SERPL-MCNC: 0.7 MG/DL (ref 0.2–1.3)
BUN SERPL-MCNC: 14 MG/DL (ref 7–20)
CALCIUM SERPL-MCNC: 9.1 MG/DL (ref 8.4–10.2)
CHLORIDE SERPL-SCNC: 106 MMOL/L (ref 97–110)
CO2 SERPL-SCNC: 25 MMOL/L (ref 21–31)
CREAT SERPL-MCNC: 0.92 MG/DL (ref 0.61–1.24)
EOSINOPHIL # BLD: 0.1 10^3/UL (ref 0–0.5)
EOSINOPHIL NFR BLD: 0.4 % (ref 0–7)
ERYTHROCYTE [DISTWIDTH] IN BLOOD BY AUTOMATED COUNT: 12.4 % (ref 11.5–14.5)
GLOBULIN SER-MCNC: 2.7 G/DL (ref 1.3–3.2)
GLUCOSE SERPL-MCNC: 130 MG/DL (ref 70–220)
HCT VFR BLD CALC: 40.6 % (ref 42–52)
HGB BLD-MCNC: 13.9 G/DL (ref 14–18)
LYMPHOCYTES # BLD AUTO: 1.1 10^3/UL (ref 0.8–2.9)
LYMPHOCYTES NFR BLD AUTO: 6 % (ref 15–51)
MAGNESIUM SERPL-MCNC: 1.8 MG/DL (ref 1.7–2.5)
MCH RBC QN AUTO: 31.9 PG (ref 29–33)
MCHC RBC AUTO-ENTMCNC: 34.2 G/DL (ref 32–37)
MCV RBC AUTO: 93.1 FL (ref 82–101)
MONOCYTES # BLD: 1.2 10^3/UL (ref 0.3–0.9)
MONOCYTES NFR BLD: 6.6 % (ref 0–11)
NEUTROPHILS # BLD: 15.8 10^3/UL (ref 1.6–7.5)
NEUTROPHILS NFR BLD AUTO: 86 % (ref 39–77)
NRBC # BLD MANUAL: 0 10^3/UL (ref 0–0)
NRBC BLD AUTO-RTO: 0 /100WBC (ref 0–0)
PHOSPHATE SERPL-MCNC: 2.9 MG/DL (ref 2.5–4.9)
PLATELET # BLD: 176 10^3/UL (ref 140–415)
PMV BLD AUTO: 9.7 FL (ref 7.4–10.4)
POTASSIUM SERPL-SCNC: 4.1 MMOL/L (ref 3.5–5.1)
PROT SERPL-MCNC: 6.5 G/DL (ref 6.1–8.1)
RBC # BLD AUTO: 4.36 10^6/UL (ref 4.7–6.1)
SODIUM SERPL-SCNC: 141 MMOL/L (ref 135–144)
WBC # BLD AUTO: 18.4 10^3/UL (ref 4.8–10.8)

## 2017-11-09 RX ADMIN — VITAMIN D, TAB 1000IU (100/BT) SCH UNIT: 25 TAB at 08:41

## 2017-11-09 RX ADMIN — ATORVASTATIN CALCIUM SCH MG: 10 TABLET, FILM COATED ORAL at 20:31

## 2017-11-09 RX ADMIN — AMLODIPINE BESYLATE SCH MG: 10 TABLET ORAL at 08:41

## 2017-11-09 RX ADMIN — EZETIMIBE SCH MG: 10 TABLET ORAL at 22:30

## 2017-11-09 RX ADMIN — CEFTRIAXONE SCH MLS/HR: 1 INJECTION, SOLUTION INTRAVENOUS at 08:41

## 2017-11-09 RX ADMIN — HEPARIN SODIUM SCH UNIT: 5000 INJECTION, SOLUTION INTRAVENOUS; SUBCUTANEOUS at 09:00

## 2017-11-09 RX ADMIN — HEPARIN SODIUM SCH UNIT: 5000 INJECTION, SOLUTION INTRAVENOUS; SUBCUTANEOUS at 20:34

## 2017-11-09 RX ADMIN — ASPIRIN SCH MG: 81 TABLET, COATED ORAL at 08:41

## 2017-11-09 RX ADMIN — CEFTRIAXONE SCH MLS/HR: 1 INJECTION, SOLUTION INTRAVENOUS at 20:31

## 2017-11-09 NOTE — PN
Date/Time of Note


Date/Time of Note


DATE: 11/9/17 


TIME: 14:36





Assessment/Plan


VTE Prophylaxis


VTE Prophylaxis Intervention:  LMWH





Lines/Catheters


IV Catheter Type (from [facility]):  Saline Lock


Urinary Cath still in place:  No





Assessment/Plan


Assessment/Plan


1. UTI, on rocephin, follow up with culture


2. Sepsis, improving on IVF


3. HTN, controlled


4. Dyslipidemia,


5. Acute encephalopathy, sepsis related


6. DVT prophylaxis: lovenox





Subjective


24 Hr Interval Summary


Free Text/Dictation


alert, but confused





Exam/Review of Systems


Vital Signs


Vitals





 Vital Signs








  Date Time  Temp Pulse Resp B/P Pulse Ox O2 Delivery O2 Flow Rate FiO2


 


11/9/17 14:07 98.3 91 18 135/63 94   


 


11/9/17 10:47      Nasal Cannula 2.0 














 Intake and Output   


 


 11/8/17 11/8/17 11/9/17





 15:00 23:00 07:00


 


Intake Total   100 ml


 


Balance   100 ml











Exam


Constitutional:  alert, obese


Head:  atraumatic, normocephalic


Eyes:  EOMI, PERRL, nl conjunctiva, nl lids, nl sclera


ENMT:  nl external ears & nose, nl lips & teeth, nl nasal mucosa & septum


Neck:  non-tender, supple


Respiratory:  clear to auscultation, normal air movement, 


   No congested cough, No crackles/rales, No diminished breath sounds, No 

intercostal retraction, No labored breathing, No other, No respirations, No 

tactile fremitus, No wheezing


Cardiovascular:  nl pulses, regular rate and rhythm


Gastrointestinal:  nl liver, spleen, non-tender, soft, 


   No ascites, No bowel sounds, No distended, No firm, No hepatomegaly, No mass

, No other, No rebound or guarding, No splenomegaly, No surgical scars, No 

tender


Musculoskeletal:  nl extremities to inspection


Extremities:  normal pulses, 


   No calf tenderness, No clubbing, No cyanosis, No edema, No other, No 

palpable cord, No pitting pedal edema, No tenderness


Neurological:  CNS II-XII intact, nl mental status, nl speech, nl strength


Skin:  nl turgor





Results


Result Diagram:  


11/9/17 0511 11/9/17 0511





Results 24 hrs





Laboratory Tests








Test


  11/8/17


19:05 11/8/17


21:00 11/8/17


22:00 11/8/17


23:10


 


White Blood Count 16.2  #H   


 


Red Blood Count 5.27     


 


Hemoglobin 16.8     


 


Hematocrit 49.0     


 


Mean Corpuscular Volume 93.0     


 


Mean Corpuscular Hemoglobin 31.9     


 


Mean Corpuscular Hemoglobin


Concent 34.3  


  


  


  


 


 


Red Cell Distribution Width 12.2     


 


Platelet Count 222     


 


Mean Platelet Volume 9.5     


 


Neutrophils % 83.6  H   


 


Lymphocytes % 9.3  L   


 


Monocytes % 5.5     


 


Eosinophils % 0.8     


 


Basophils % 0.4     


 


Nucleated Red Blood Cells % 0.0     


 


Neutrophils # 13.5  H   


 


Lymphocytes # 1.5     


 


Monocytes # 0.9     


 


Eosinophils # 0.1     


 


Basophils # 0.1     


 


Nucleated Red Blood Cells # 0.0     


 


Prothrombin Time 12.2     


 


Prothrombin Time Ratio 1.0     


 


INR International Normalized


Ratio 0.91  


  


  


  


 


 


Activated Partial


Thromboplast Time 29.6  


  


  


  


 


 


Sodium Level 142     


 


Potassium Level 4.4     


 


Chloride Level 103     


 


Carbon Dioxide Level 26     


 


Anion Gap 17  H   


 


Blood Urea Nitrogen 16     


 


Creatinine 1.01     


 


Glucose Level 133     


 


Lactic Acid Level 1.9   1.6    2.2  *H


 


Calcium Level 9.7     


 


Total Bilirubin 0.4     


 


Direct Bilirubin 0.00     


 


Indirect Bilirubin 0.4     


 


Aspartate Amino Transf


(AST/SGOT) 40  


  


  


  


 


 


Alanine Aminotransferase


(ALT/SGPT) 39  


  


  


  


 


 


Alkaline Phosphatase 90     


 


Troponin I < 0.012     


 


Total Protein 8.3  H   


 


Albumin 4.8     


 


Globulin 3.50  H   


 


Albumin/Globulin Ratio 1.37     


 


Urine Color   YELLOW   


 


Urine Clarity   CLOUDY  A 


 


Urine pH   5.0   


 


Urine Specific Gravity   1.023   


 


Urine Ketones   TRACE  A 


 


Urine Nitrite   NEGATIVE   


 


Urine Bilirubin   NEGATIVE   


 


Urine Urobilinogen   NEGATIVE   


 


Urine Leukocyte Esterase   3+  H 


 


Urine Microscopic RBC   21  H 


 


Urine Microscopic WBC   > 182  H 


 


Urine Squamous Epithelial


Cells 


  


  FEW  


  


 


 


Urine Bacteria   FEW  A 


 


Urine Hemoglobin   2+  H 


 


Urine Glucose   NEGATIVE   


 


Urine Total Protein   1+  H 














Test


  11/9/17


05:11 


  


  


 


 


White Blood Count 18.4  H   


 


Red Blood Count 4.36  L   


 


Hemoglobin 13.9  L   


 


Hematocrit 40.6  L   


 


Mean Corpuscular Volume 93.1     


 


Mean Corpuscular Hemoglobin 31.9     


 


Mean Corpuscular Hemoglobin


Concent 34.2  


  


  


  


 


 


Red Cell Distribution Width 12.4     


 


Platelet Count 176  #   


 


Mean Platelet Volume 9.7     


 


Neutrophils % 86.0  H   


 


Lymphocytes % 6.0  L   


 


Monocytes % 6.6     


 


Eosinophils % 0.4     


 


Basophils % 0.3     


 


Nucleated Red Blood Cells % 0.0     


 


Neutrophils # 15.8  H   


 


Lymphocytes # 1.1     


 


Monocytes # 1.2  H   


 


Eosinophils # 0.1     


 


Basophils # 0.1     


 


Nucleated Red Blood Cells # 0.0     


 


Sodium Level 141     


 


Potassium Level 4.1     


 


Chloride Level 106     


 


Carbon Dioxide Level 25     


 


Anion Gap 14     


 


Blood Urea Nitrogen 14     


 


Creatinine 0.92     


 


Glucose Level 130     


 


Lactic Acid Level 1.6     


 


Calcium Level 9.1     


 


Phosphorus Level 2.9     


 


Magnesium Level 1.8     


 


Total Bilirubin 0.7     


 


Direct Bilirubin 0.00     


 


Indirect Bilirubin 0.7     


 


Aspartate Amino Transf


(AST/SGOT) 23  


  


  


  


 


 


Alanine Aminotransferase


(ALT/SGPT) 32  


  


  


  


 


 


Alkaline Phosphatase 56     


 


B-Type Natriuretic Peptide 278     


 


Total Protein 6.5  #   


 


Albumin 3.8  #   


 


Globulin 2.70     


 


Albumin/Globulin Ratio 1.40     











Medications


Medications





 Current Medications


Amlodipine Besylate (Norvasc) 10 mg DAILY PO  Last administered on 11/9/17at 08:

41; Admin Dose 10 MG;  Start 11/9/17 at 09:00


Aspirin (Halfprin) 81 mg DAILY PO  Last administered on 11/9/17at 08:41; Admin 

Dose 81 MG;  Start 11/9/17 at 09:00


Celecoxib (Celebrex) 100 mg DAILY  PRN PO PAIN;  Start 11/9/17 at 01:00


Cholecalciferol 1000 unit 1,000 unit DAILY PO  Last administered on 11/9/17at 08

:41; Admin Dose 1,000 UNIT;  Start 11/9/17 at 09:00


Ceftriaxone Sodium (Rocephin) 50 ml @  100 mls/hr Q12 IVPB  Last administered 

on 11/9/17at 08:41; Admin Dose 100 MLS/HR;  Start 11/9/17 at 09:00


Acetaminophen (Tylenol Tab) 650 mg Q6H  PRN PO PAIN AND OR ELEVATED TEMP Last 

administered on 11/9/17at 08:41; Admin Dose 650 MG;  Start 11/9/17 at 01:00


Ondansetron HCl (Zofran Inj) 4 mg Q6H  PRN IV NAUSEA AND/OR VOMITING;  Start 11/ 9/17 at 01:00


Morphine Sulfate (morphine) 2 mg Q4H  PRN IV PAIN Last administered on 11/9/ 17at 05:37; Admin Dose 2 MG;  Start 11/9/17 at 01:00


Heparin Sodium (Porcine) (Heparin  (5000 Units/0.5 ml)) 5,000 unit BID SC ;  

Start 11/9/17 at 09:00


Tolterodine Tartrate (Detrol La) 4 mg DAILY PO ;  Start 11/10/17 at 09:00


EZETIMIBE (Zetia) 10 mg DAILY@21 PO ;  Start 11/9/17 at 21:00


Atorvastatin Calcium (Lipitor) 10 mg DAILY@21 PO ;  Start 11/9/17 at 21:00











VALENTINO VILLARREAL MD Nov 9, 2017 14:41

## 2017-11-09 NOTE — HP
Date/Time of Note


Date/Time of Note


DATE: 11/9/17 


TIME: 05:41





Assessment/Plan


VTE Prophylaxis


VTE Prophylaxis Intervention:  heparin, SCD's





Lines/Catheters


[facility] Catheter Type (from [facility]):  Saline Lock


Urinary Cath still in place:  No





Assessment/Plan


Assessment/Plan





1.  Sepsis, as evidenced by fever, leukocytosis and tachycardia, secondary to 

UTI


-IV antibiotic and IV fluids


-Follow-up culture results





2.  Altered mentation, most likely secondary to above: 


-will treat with sepsis. 


-will obtain head CT





3.  Bilateral lower extremity swelling


-Obtain a 2D echo and BNP





4.  History of hypertension: Currently BP within goal


-Continue antihypertensives adjustment as needed





HPI/ROS


Admit Date/Time


Admit Date/Time


Nov 8, 2017 at 21:56





Hx of Present Illness





This is an 83-year-old male with history of hypertension, dyslipidemia, 

arthritis who was brought to the emergency department complaining of bilateral 

lower extremity swelling, fever and altered mentation.  Patient unable to give 

meaningful history, but reported frequent urination and progressively worsening 

bilateral lower extremity swelling.


When he comes to the ER, he was febrile with a temperature of 100.8, heart rate 

103.  Labs shows a WBC of 16,000 and lactic acid of 2.2.  Urinalysis is 

consistent with UTI.





PMH/Family/Social


Social History


Smoking Status:  Never smoker





Exam/Review of Systems


Vital Signs


Vitals





 Vital Signs








  Date Time  Temp Pulse Resp B/P Pulse Ox O2 Delivery O2 Flow Rate FiO2


 


11/9/17 03:16 98.8 92 20 119/58 92   


 


11/9/17 00:15      Room Air  











Exam


Constitutional:  other (Confused and not fully oriented)


Head:  atraumatic, normocephalic


Eyes:  PERRL


Respiratory:  clear to auscultation


Cardiovascular:  other (Tachycardic with regular rhythm)


Gastrointestinal:  soft


Extremities:  edema





Labs


Result Diagram:  


11/8/17 1905 11/8/17 1905








Medications


Medications





 Current Medications


Amlodipine Besylate (Norvasc) 10 mg DAILY PO ;  Start 11/9/17 at 09:00


Aspirin (Halfprin) 81 mg DAILY PO ;  Start 11/9/17 at 09:00


Celecoxib (Celebrex) 100 mg DAILY  PRN PO PAIN;  Start 11/9/17 at 01:00


Cholecalciferol (Vitamin D) 1,000 unit DAILY PO ;  Start 11/9/17 at 09:00


Miscellaneous Information 1 each DAILY PO ;  Start 11/9/17 at 09:00;  Status UNV


Miscellaneous Information 25 mg 25 mg DAILY PO ;  Start 11/9/17 at 09:00;  

Status UNV


Ceftriaxone Sodium (Rocephin) 50 ml @  100 mls/hr Q12 IVPB ;  Start 11/9/17 at 

09:00


Acetaminophen (Tylenol Tab) 650 mg Q6H  PRN PO PAIN AND OR ELEVATED TEMP;  

Start 11/9/17 at 01:00


Ondansetron HCl (Zofran Inj) 4 mg Q6H  PRN IV NAUSEA AND/OR VOMITING;  Start 11/ 9/17 at 01:00


Morphine Sulfate (morphine) 2 mg Q4H  PRN IV PAIN;  Start 11/9/17 at 01:00


Heparin Sodium (Porcine) (Heparin  (5000 Units/0.5 ml)) 5,000 unit BID SC ;  

Start 11/9/17 at 09:00











JONA GRAHAM MD Nov 9, 2017 05:47

## 2017-11-09 NOTE — RADRPT
PROCEDURE:   CT Brain without contrast. 

 

CLINICAL INDICATION:    Altered mental status. 

 

TECHNIQUE:   A CT of the brain was performed on a multidetector CT scanner utilizing axial imaging f
rom the skull base through the vertex without IV contrast.  Multiplanar reformatted images were made
.  Images were reviewed on a PACS workstation.  The CTDIvol is 45 mGy and the DLP is with 720 mGycm.
  

 

One or more of the following dose reduction techniques were utilized:

1.) Automated exposure control

2.) Adjustment of the mA +/- kV according to patient's size

3.) Use of iterative reconstruction technique.

 

COMPARISON:   8 head CT March 6, 2017 

 

FINDINGS:

There is moderate to severe diffuse cerebral volume loss with sulcal and ventricular dilatation. No 
discrete extra-axial fluid collection or mass is visualized. The ventricles are in the midline. Ther
e is periventricular white matter disease in both cerebral hemispheres. No associated mass effect is
 present. Chronic left basal ganglia lacunar infarct is identified. There is preservation of normal 
gray-white distinction. No intracranial hemorrhage is noted. There is normal aeration of the visuali
zed paranasal sinuses.

 

IMPRESSION:

 

Atrophy with white matter disease compatible with chronic small vessel ischemia. Chronic left basal 
ganglia lacunar infarct. No intracranial hemorrhage, mass or acute infarct.

_____________________________________________ 

.Santos Rahman MD, MD           Date    Time 

Electronically viewed and signed by .Santos Rahman MD, MD on 11/09/2017 08:34 

 

D:  11/09/2017 08:34  T:  11/09/2017 08:34

.A/

## 2017-11-09 NOTE — RADRPT
Echocardiogram Report

 

Patient Name:  MAGDY SMITH   Gender:       Male

MRN:           [id number]        Accession #:  [id number]

Birth Date:    1934   Study Date:   09-Nov-2017

Sonographer:   HARMAN Beltrán [facility]  Location:     [facility]

 

Ref. Physician: JONA GRAHAM

Quality: Technically Difficult Study

 

Procedures: Transthoracic echocardiogram with complete 2D, M-Mode, and 

doppler examination.

Indications: LE Swelling.

 

2D/M Mode                          Doppler

Measurement  Value  Normal Ranges  Measurement    Value  Normal Ranges

LVIDd 2D     3.7    3.5 - 5.6 cm   JACKIE Vmax       2.2    cm2

LVIDs 2D     2.0    2.1 - 4.1 cm   JACKIE VTI        2.4    cm2

FS 2D        44.9   %              AV Mean Enrique    1.7    m/sec

LVPWd 2D     1.3    0.6 - 1.1 cm   AV Mean PG     13.0   mmHg

IVSd 2D      1.4    0.6 - 1.1 cm   AV Peak Enrique    2.5    m/sec

IVS/LVPW 2D  1.0                   AV Peak PG     25.0   mmHg

AoR Diam 2D  2.7    2.0 - 3.7 cm   AV VTI         42.4   cm

LA/Ao 2D     1      0 - 1          AI Peak PG     57.0   mmHg

EDV 2D       50.7   cm3            AI Peak Enrique    3.8    m/sec

ESV 2D       8.5    cm3            AI PHT         397.0  msec

LA Dimen 2D  3.9    2.3 - 4.0 cm   LVOT Mean Enrique  1.0    m/sec

LVOT Diam    2.2    cm             LVOT Mean PG   5.0    mmHg

LVOT Area    3.8    cm2            LVOT Peak Enrique  1.5    m/sec

LVOT Peak PG   9.0    mmHg

LVOT VTI       26.5   cm

MV E Peak Enrique  1.0    m/sec

MV A Peak Enrique  1.6    m/sec

MV E/A         0.6

MV Decel Time  151    msec

MV E/A         0.6

TR Peak Enrique    2.7    m/sec

TR Peak PG     30.0   mmHg

RVSP           33.0   mmHg

 

Findings

Left Ventricle: Normal left ventricular systolic function.  Normal left 

ventricular cavity size.  Moderate concentric left ventricular 

hypertrophy.  Ejection fraction is visually estimated at 65 %.  Tissue 

Doppler/Mitral Doppler indices are consistent with impaired relaxation 

(Stage I diastolic dysfunction).

Right Ventricle: Normal right ventricular size.  Normal right 

ventricular systolic function.

Left Atrium: The left atrium is normal in size.

Right Atrium: The right atrium is normal in size.

Mitral Valve: Mitral valve leaflets appear mildly thickened.  Moderate 

mitral annular calcification.  Trace mitral regurgitation.

Aortic Valve: Mild aortic stenosis.  Aortic cusps appear mildly 

calcified.  Mild to moderate aortic valve regurgitation.

Tricuspid Valve: Normal appearance and function of the tricuspid valve 

with trace physiologic regurgitation.  Estimated peak PA systolic 

pressure 33 mmHg.

Pulmonic Valve: Normal pulmonic valve appearance.  There is mild 

pulmonic regurgitation.

Pericardium: Normal pericardium with no significant pericardial effusion.

Aorta: Normal aortic root.

IVC: Normal size and normal respiratory collapse consistent with normal 

right atrial pressure.

 

Conclusions

1.Normal left ventricular systolic function.  Normal left ventricular 

cavity size.  Moderate concentric left ventricular hypertrophy.  

Ejection fraction is visually estimated at 65 %.  Tissue Doppler/Mitral 

Doppler indices are consistent with impaired relaxation (Stage I 

diastolic dysfunction).

2.Normal right ventricular size.  Normal right ventricular systolic 

function.

3.The left atrium is normal in size.

4.The right atrium is normal in size.

5.Mild aortic stenosis.  Mild to moderate aortic valve regurgitation.

6.There is mild pulmonic regurgitation.

7.No significant valvular stenosis or regurgitation seen of remaining 

visualized valves.

8.Normal pericardium with no significant pericardial effusion.

 

Electronically Signed By:

Stefano Sparks

09-Nov-2017 15:25:59  -0800

 

Patient Name: MAGDY SMITH

MRN: [id number]

Study Date: 09-Nov-2017

 

[id number]

## 2017-11-10 VITALS — RESPIRATION RATE: 20 BRPM | DIASTOLIC BLOOD PRESSURE: 75 MMHG | SYSTOLIC BLOOD PRESSURE: 140 MMHG

## 2017-11-10 VITALS — RESPIRATION RATE: 18 BRPM | DIASTOLIC BLOOD PRESSURE: 65 MMHG | SYSTOLIC BLOOD PRESSURE: 126 MMHG

## 2017-11-10 VITALS — RESPIRATION RATE: 19 BRPM | DIASTOLIC BLOOD PRESSURE: 75 MMHG | SYSTOLIC BLOOD PRESSURE: 144 MMHG

## 2017-11-10 VITALS — HEART RATE: 80 BPM | SYSTOLIC BLOOD PRESSURE: 125 MMHG | RESPIRATION RATE: 20 BRPM | DIASTOLIC BLOOD PRESSURE: 80 MMHG

## 2017-11-10 RX ADMIN — CEFTRIAXONE SCH MLS/HR: 1 INJECTION, SOLUTION INTRAVENOUS at 21:50

## 2017-11-10 RX ADMIN — HEPARIN SODIUM SCH UNIT: 5000 INJECTION, SOLUTION INTRAVENOUS; SUBCUTANEOUS at 21:54

## 2017-11-10 RX ADMIN — ATORVASTATIN CALCIUM SCH MG: 10 TABLET, FILM COATED ORAL at 21:50

## 2017-11-10 RX ADMIN — EZETIMIBE SCH MG: 10 TABLET ORAL at 21:50

## 2017-11-10 RX ADMIN — AMLODIPINE BESYLATE SCH MG: 10 TABLET ORAL at 08:16

## 2017-11-10 RX ADMIN — HEPARIN SODIUM SCH UNIT: 5000 INJECTION, SOLUTION INTRAVENOUS; SUBCUTANEOUS at 09:00

## 2017-11-10 RX ADMIN — TOLTERODINE TARTRATE SCH MG: 4 CAPSULE, EXTENDED RELEASE ORAL at 08:16

## 2017-11-10 RX ADMIN — CEFTRIAXONE SCH MLS/HR: 1 INJECTION, SOLUTION INTRAVENOUS at 08:15

## 2017-11-10 RX ADMIN — HEPARIN SODIUM SCH UNIT: 5000 INJECTION, SOLUTION INTRAVENOUS; SUBCUTANEOUS at 08:17

## 2017-11-10 RX ADMIN — VITAMIN D, TAB 1000IU (100/BT) SCH UNIT: 25 TAB at 08:15

## 2017-11-10 RX ADMIN — ASPIRIN SCH MG: 81 TABLET, COATED ORAL at 08:16

## 2017-11-10 NOTE — RADRPT
PROCEDURE:   XR Shoulder. 

 

CLINICAL INDICATION:   Right shoulder pain 

 

TECHNIQUE:   Two views of the right shoulder are available for review. 

 

COMPARISON:   None available 

 

FINDINGS:

 

The osseous structures demonstrate normal alignment and mineralization.  No acute fracture or disloc
ation is seen. Ulnohumeral joint is not well visualized secondary to patient positioning there are d
egenerative changes of the acromioclavicular joint. No soft tissue abnormalities appreciated.  The v
isualized portion of the right lung is clear.

 

 

IMPRESSION:

 

Limited evaluation secondary to patient positioning. The glenohumeral joint is not well visualized. 
No acute fracture is seen.

 

 

RPTAT: HH

_____________________________________________ 

.Polly Adams MD, MD           Date    Time 

Electronically viewed and signed by .Polly Adams MD, MD on 11/10/2017 06:44 

 

D:  11/10/2017 06:44  T:  11/10/2017 06:44

.G/

## 2017-11-10 NOTE — PN
Date/Time of Note


Date/Time of Note


DATE: 11/10/17 


TIME: 14:22





Assessment/Plan


VTE Prophylaxis


VTE Prophylaxis Intervention:  SCD's





Lines/Catheters


IV Catheter Type (from [facility]):  Saline Lock


Urinary Cath still in place:  No





Assessment/Plan


Assessment/Plan


1. UTI, on rocephin, follow up with culture


2. Sepsis, improved


3. HTN, controlled


4. Dyslipidemia,


5. Acute encephalopathy, sepsis related, resolved


6. DVT prophylaxis: lovenox





Subjective


24 Hr Interval Summary


Free Text/Dictation


alert and oriented to his baseline mentally according to his wife. weak





Exam/Review of Systems


Vital Signs


Vitals





 Vital Signs








  Date Time  Temp Pulse Resp B/P Pulse Ox O2 Delivery O2 Flow Rate FiO2


 


11/10/17 08:43       2.0 


 


11/10/17 08:13 98.8 89 20 140/75 96   


 


11/10/17 02:00      Room Air  














 Intake and Output   


 


 11/9/17 11/9/17 11/10/17





 14:59 22:59 06:59


 


Intake Total 50 ml 360 ml 300 ml


 


Balance 50 ml 360 ml 300 ml











Exam


Constitutional:  alert, oriented, well developed


Psych:  nl mood/affect, no complaints


Head:  atraumatic, normocephalic


Eyes:  EOMI, nl conjunctiva, nl lids


ENMT:  nl external ears & nose, nl lips & teeth, nl nasal mucosa & septum


Neck:  non-tender, supple


Respiratory:  clear to auscultation, normal air movement, 


   No congested cough, No crackles/rales, No diminished breath sounds, No 

intercostal retraction, No labored breathing, No other, No respirations, No 

tactile fremitus, No wheezing


Cardiovascular:  nl pulses, regular rate and rhythm, 


   No S3, No S4, No bruits, No diastolic murmur, No edema, No gallop, No 

irregular rhythm, No jugular venous distention (JVD), No murmurs/extra sounds, 

No other, No rub, No systolic murmur


Gastrointestinal:  nl liver, spleen, non-tender, soft, 


   No ascites, No bowel sounds, No distended, No firm, No hepatomegaly, No mass

, No other, No rebound or guarding, No splenomegaly, No surgical scars, No 

tender


Musculoskeletal:  nl extremities to inspection


Extremities:  normal pulses


Neurological:  CNS II-XII intact, nl mental status, nl speech





Results


Result Diagram:  


11/9/17 0511                                                                   

             11/9/17 0511








Medications


Medications





 Current Medications


Amlodipine Besylate (Norvasc) 10 mg DAILY PO  Last administered on 11/10/17at 08

:16; Admin Dose 10 MG;  Start 11/9/17 at 09:00


Aspirin (Halfprin) 81 mg DAILY PO  Last administered on 11/10/17at 08:16; Admin 

Dose 81 MG;  Start 11/9/17 at 09:00


Celecoxib (Celebrex) 100 mg DAILY  PRN PO PAIN Last administered on 11/9/17at 22

:29; Admin Dose 100 MG;  Start 11/9/17 at 01:00


Cholecalciferol 1000 unit 1,000 unit DAILY PO  Last administered on 11/10/17at 

08:15; Admin Dose 1,000 UNIT;  Start 11/9/17 at 09:00


Ceftriaxone Sodium (Rocephin) 50 ml @  100 mls/hr Q12 IVPB  Last administered 

on 11/10/17at 08:15; Admin Dose 100 MLS/HR;  Start 11/9/17 at 09:00


Acetaminophen (Tylenol Tab) 650 mg Q6H  PRN PO PAIN AND OR ELEVATED TEMP Last 

administered on 11/9/17at 08:41; Admin Dose 650 MG;  Start 11/9/17 at 01:00


Ondansetron HCl (Zofran Inj) 4 mg Q6H  PRN IV NAUSEA AND/OR VOMITING;  Start 11/ 9/17 at 01:00


Morphine Sulfate (morphine) 2 mg Q4H  PRN IV PAIN Last administered on 11/9/ 17at 05:37; Admin Dose 2 MG;  Start 11/9/17 at 01:00


Heparin Sodium (Porcine) (Heparin  (5000 Units/0.5 ml)) 5,000 unit BID SC  Last 

administered on 11/9/17at 20:34; Admin Dose 5,000 UNIT;  Start 11/9/17 at 09:00


Tolterodine Tartrate (Detrol La) 4 mg DAILY PO  Last administered on 11/10/17at 

08:16; Admin Dose 4 MG;  Start 11/10/17 at 09:00


EZETIMIBE (Zetia) 10 mg DAILY@21 PO  Last administered on 11/9/17at 22:30; 

Admin Dose 10 MG;  Start 11/9/17 at 21:00


Atorvastatin Calcium (Lipitor) 10 mg DAILY@21 PO  Last administered on 11/9/ 17at 20:31; Admin Dose 10 MG;  Start 11/9/17 at 21:00


Miscellaneous Information Patients own medicat... BID@10,16 XX ;  Start 11/10/

17 at 10:00











VALENTINO VILLARREAL MD Nov 10, 2017 14:24

## 2017-11-11 VITALS — RESPIRATION RATE: 19 BRPM | DIASTOLIC BLOOD PRESSURE: 81 MMHG | SYSTOLIC BLOOD PRESSURE: 158 MMHG

## 2017-11-11 VITALS — SYSTOLIC BLOOD PRESSURE: 123 MMHG | RESPIRATION RATE: 19 BRPM | DIASTOLIC BLOOD PRESSURE: 63 MMHG

## 2017-11-11 VITALS — SYSTOLIC BLOOD PRESSURE: 137 MMHG | DIASTOLIC BLOOD PRESSURE: 63 MMHG | RESPIRATION RATE: 18 BRPM

## 2017-11-11 RX ADMIN — HEPARIN SODIUM SCH UNIT: 5000 INJECTION, SOLUTION INTRAVENOUS; SUBCUTANEOUS at 20:35

## 2017-11-11 RX ADMIN — AMLODIPINE BESYLATE SCH MG: 10 TABLET ORAL at 08:57

## 2017-11-11 RX ADMIN — CEFTRIAXONE SCH MLS/HR: 1 INJECTION, SOLUTION INTRAVENOUS at 20:34

## 2017-11-11 RX ADMIN — HEPARIN SODIUM SCH UNIT: 5000 INJECTION, SOLUTION INTRAVENOUS; SUBCUTANEOUS at 09:00

## 2017-11-11 RX ADMIN — ATORVASTATIN CALCIUM SCH MG: 10 TABLET, FILM COATED ORAL at 20:34

## 2017-11-11 RX ADMIN — VITAMIN D, TAB 1000IU (100/BT) SCH UNIT: 25 TAB at 08:57

## 2017-11-11 RX ADMIN — ASPIRIN SCH MG: 81 TABLET, COATED ORAL at 08:57

## 2017-11-11 RX ADMIN — EZETIMIBE SCH MG: 10 TABLET ORAL at 20:34

## 2017-11-11 RX ADMIN — CEFTRIAXONE SCH MLS/HR: 1 INJECTION, SOLUTION INTRAVENOUS at 08:58

## 2017-11-11 RX ADMIN — TOLTERODINE TARTRATE SCH MG: 4 CAPSULE, EXTENDED RELEASE ORAL at 08:57

## 2017-11-11 NOTE — PN
Date/Time of Note


Date/Time of Note


DATE: 11/11/17 


TIME: 13:19





Assessment/Plan


VTE Prophylaxis


VTE Prophylaxis Intervention:  LMWH





Lines/Catheters


[facility] Type ([facility]  Saline Lock


Urinary Cath still in place:  No





Assessment/Plan


Chief Complaint/Hosp Course


1.  Sepsis with acute metabolic encephalopathy secondary to UTI-improved


Continue Rocephin





2.  Hypertension


Controlled on meds





3. Dyslipidemia


Continue statin





Prophylaxis: Lovenox


Problems:  





Subjective


24 Hr Interval Summary


Constitutional:  no complaints





Exam/Review of Systems


Vital Signs


Vitals





 Vital Signs








  Date Time  Temp Pulse Resp B/P Pulse Ox O2 Delivery O2 Flow Rate FiO2


 


11/11/17 07:35 98.5 77 18 137/63 94   


 


11/10/17 20:00      Nasal Cannula 2.0 














 Intake and Output   


 


 11/10/17 11/10/17 11/11/17





 15:00 23:00 07:00


 


Intake Total 50 ml 1470 ml 250 ml


 


Balance 50 ml 1470 ml 250 ml











Exam


Constitutional:  alert, oriented


Respiratory:  clear to auscultation


Cardiovascular:  regular rate and rhythm


Gastrointestinal:  soft, 


   No distended


Musculoskeletal:  nl extremities to inspection





Results


Result Diagram:  


11/9/17 0511 11/9/17 0511








Medications


Medications





 Current Medications


Amlodipine Besylate (Norvasc) 10 mg DAILY PO  Last administered on 11/11/17at 08

:57; Admin Dose 10 MG;  Start 11/9/17 at 09:00


Aspirin (Halfprin) 81 mg DAILY PO  Last administered on 11/11/17at 08:57; Admin 

Dose 81 MG;  Start 11/9/17 at 09:00


Celecoxib (Celebrex) 100 mg DAILY  PRN PO PAIN Last administered on 11/9/17at 22

:29; Admin Dose 100 MG;  Start 11/9/17 at 01:00


Cholecalciferol 1000 unit 1,000 unit DAILY PO  Last administered on 11/11/17at 

08:57; Admin Dose 1,000 UNIT;  Start 11/9/17 at 09:00


Ceftriaxone Sodium (Rocephin) 50 ml @  100 mls/hr Q12 IVPB  Last administered 

on 11/11/17at 08:58; Admin Dose 100 MLS/HR;  Start 11/9/17 at 09:00


Acetaminophen (Tylenol Tab) 650 mg Q6H  PRN PO PAIN AND OR ELEVATED TEMP Last 

administered on 11/9/17at 08:41; Admin Dose 650 MG;  Start 11/9/17 at 01:00


Ondansetron HCl (Zofran Inj) 4 mg Q6H  PRN IV NAUSEA AND/OR VOMITING;  Start 11/ 9/17 at 01:00


Morphine Sulfate (morphine) 2 mg Q4H  PRN IV PAIN Last administered on 11/9/ 17at 05:37; Admin Dose 2 MG;  Start 11/9/17 at 01:00


Heparin Sodium (Porcine) (Heparin  (5000 Units/0.5 ml)) 5,000 unit BID SC  Last 

administered on 11/10/17at 21:54; Admin Dose 5,000 UNIT;  Start 11/9/17 at 09:00


Tolterodine Tartrate (Detrol La) 4 mg DAILY PO  Last administered on 11/11/17at 

08:57; Admin Dose 4 MG;  Start 11/10/17 at 09:00


EZETIMIBE (Zetia) 10 mg DAILY@21 PO  Last administered on 11/10/17at 21:50; 

Admin Dose 10 MG;  Start 11/9/17 at 21:00


Atorvastatin Calcium (Lipitor) 10 mg DAILY@21 PO  Last administered on 11/10/

17at 21:50; Admin Dose 10 MG;  Start 11/9/17 at 21:00


Miscellaneous Information Patients own medicat... BID@10,16 XX ;  Start 11/10/

17 at 10:00











GERALDO JAMISON Nov 11, 2017 13:21

## 2017-11-12 VITALS — SYSTOLIC BLOOD PRESSURE: 143 MMHG | RESPIRATION RATE: 17 BRPM | DIASTOLIC BLOOD PRESSURE: 83 MMHG

## 2017-11-12 VITALS — SYSTOLIC BLOOD PRESSURE: 144 MMHG | RESPIRATION RATE: 17 BRPM | DIASTOLIC BLOOD PRESSURE: 99 MMHG

## 2017-11-12 VITALS — RESPIRATION RATE: 19 BRPM | DIASTOLIC BLOOD PRESSURE: 64 MMHG | SYSTOLIC BLOOD PRESSURE: 134 MMHG

## 2017-11-12 VITALS — RESPIRATION RATE: 19 BRPM | DIASTOLIC BLOOD PRESSURE: 61 MMHG | SYSTOLIC BLOOD PRESSURE: 133 MMHG

## 2017-11-12 LAB
ANION GAP SERPL CALC-SCNC: 15 MMOL/L (ref 8–16)
BASOPHILS # BLD AUTO: 0 10^3/UL (ref 0–0.1)
BASOPHILS NFR BLD: 0.6 % (ref 0–2)
BUN SERPL-MCNC: 13 MG/DL (ref 7–20)
CALCIUM SERPL-MCNC: 8.8 MG/DL (ref 8.4–10.2)
CHLORIDE SERPL-SCNC: 106 MMOL/L (ref 97–110)
CO2 SERPL-SCNC: 24 MMOL/L (ref 21–31)
CREAT SERPL-MCNC: 0.78 MG/DL (ref 0.61–1.24)
EOSINOPHIL # BLD: 0.2 10^3/UL (ref 0–0.5)
EOSINOPHIL NFR BLD: 2.4 % (ref 0–7)
ERYTHROCYTE [DISTWIDTH] IN BLOOD BY AUTOMATED COUNT: 12.4 % (ref 11.5–14.5)
GLUCOSE SERPL-MCNC: 160 MG/DL (ref 70–220)
HCT VFR BLD CALC: 45 % (ref 42–52)
HGB BLD-MCNC: 15.2 G/DL (ref 14–18)
LYMPHOCYTES # BLD AUTO: 1.3 10^3/UL (ref 0.8–2.9)
LYMPHOCYTES NFR BLD AUTO: 18.1 % (ref 15–51)
MCH RBC QN AUTO: 31 PG (ref 29–33)
MCHC RBC AUTO-ENTMCNC: 33.8 G/DL (ref 32–37)
MCV RBC AUTO: 91.6 FL (ref 82–101)
MONOCYTES # BLD: 0.5 10^3/UL (ref 0.3–0.9)
MONOCYTES NFR BLD: 7.3 % (ref 0–11)
NEUTROPHILS # BLD: 5 10^3/UL (ref 1.6–7.5)
NEUTROPHILS NFR BLD AUTO: 70.9 % (ref 39–77)
NRBC # BLD MANUAL: 0 10^3/UL (ref 0–0)
NRBC BLD AUTO-RTO: 0 /100WBC (ref 0–0)
PLATELET # BLD: 267 10^3/UL (ref 140–415)
PMV BLD AUTO: 9.8 FL (ref 7.4–10.4)
POTASSIUM SERPL-SCNC: 3.9 MMOL/L (ref 3.5–5.1)
RBC # BLD AUTO: 4.91 10^6/UL (ref 4.7–6.1)
SODIUM SERPL-SCNC: 141 MMOL/L (ref 135–144)
WBC # BLD AUTO: 7.1 10^3/UL (ref 4.8–10.8)

## 2017-11-12 RX ADMIN — HEPARIN SODIUM SCH UNIT: 5000 INJECTION, SOLUTION INTRAVENOUS; SUBCUTANEOUS at 10:01

## 2017-11-12 RX ADMIN — VITAMIN D, TAB 1000IU (100/BT) SCH UNIT: 25 TAB at 09:59

## 2017-11-12 RX ADMIN — CEFTRIAXONE SCH MLS/HR: 1 INJECTION, SOLUTION INTRAVENOUS at 20:32

## 2017-11-12 RX ADMIN — TOLTERODINE TARTRATE SCH MG: 4 CAPSULE, EXTENDED RELEASE ORAL at 09:59

## 2017-11-12 RX ADMIN — CEFTRIAXONE SCH MLS/HR: 1 INJECTION, SOLUTION INTRAVENOUS at 09:59

## 2017-11-12 RX ADMIN — ATORVASTATIN CALCIUM SCH MG: 10 TABLET, FILM COATED ORAL at 20:33

## 2017-11-12 RX ADMIN — HEPARIN SODIUM SCH UNIT: 5000 INJECTION, SOLUTION INTRAVENOUS; SUBCUTANEOUS at 09:00

## 2017-11-12 RX ADMIN — HEPARIN SODIUM SCH UNIT: 5000 INJECTION, SOLUTION INTRAVENOUS; SUBCUTANEOUS at 20:33

## 2017-11-12 RX ADMIN — ASPIRIN SCH MG: 81 TABLET, COATED ORAL at 09:59

## 2017-11-12 RX ADMIN — EZETIMIBE SCH MG: 10 TABLET ORAL at 20:33

## 2017-11-12 RX ADMIN — AMLODIPINE BESYLATE SCH MG: 10 TABLET ORAL at 09:59

## 2017-11-12 NOTE — PN
Date/Time of Note


Date/Time of Note


DATE: 11/12/17 


TIME: 18:46





Assessment/Plan


VTE Prophylaxis


VTE Prophylaxis Intervention:  [facility]





Lines/Catheters


IV Catheter Type (from [facility]):  Saline Lock


Urinary Cath still in place:  No





Assessment/Plan


Chief Complaint/Hosp Course


1.  Sepsis with acute metabolic encephalopathy secondary to UTI-improved


Continue Rocephin





2.  Hypertension


Controlled on meds





3. Dyslipidemia


Continue statin





4.  Debility


Patient has not been ambulating for a while now and wife would like to place 

patient in a nursing home


 arranging for nursing home placement





5.  Conjunctivitis of the left eye


Cipro drops in the left eye


Patient has also been started on Optivar





Prophylaxis: Lovenox


Problems:  





Subjective


24 Hr Interval Summary


Eyes:  redness





Exam/Review of Systems


Vital Signs


Vitals





 Vital Signs








  Date Time  Temp Pulse Resp B/P Pulse Ox O2 Delivery O2 Flow Rate FiO2


 


11/12/17 14:07 97.6 81 17 144/99 100   


 


11/10/17 20:00      Nasal Cannula 2.0 














 Intake and Output   


 


 11/11/17 11/11/17 11/12/17





 14:59 22:59 06:59


 


Intake Total 50 ml 850 ml 325 ml


 


Balance 50 ml 850 ml 325 ml











Exam


Constitutional:  alert


Respiratory:  clear to auscultation


Cardiovascular:  regular rate and rhythm


Gastrointestinal:  soft, 


   No distended


Musculoskeletal:  nl extremities to inspection





Results


Result Diagram:  


11/12/17 0941                                                                  

              11/12/17 0941





Results 24 hrs





Laboratory Tests








Test


  11/12/17


09:41


 


White Blood Count 7.1  #


 


Red Blood Count 4.91  


 


Hemoglobin 15.2  


 


Hematocrit 45.0  


 


Mean Corpuscular Volume 91.6  


 


Mean Corpuscular Hemoglobin 31.0  


 


Mean Corpuscular Hemoglobin


Concent 33.8  


 


 


Red Cell Distribution Width 12.4  


 


Platelet Count 267  #


 


Mean Platelet Volume 9.8  


 


Neutrophils % 70.9  


 


Lymphocytes % 18.1  


 


Monocytes % 7.3  


 


Eosinophils % 2.4  


 


Basophils % 0.6  


 


Nucleated Red Blood Cells % 0.0  


 


Neutrophils # 5.0  


 


Lymphocytes # 1.3  


 


Monocytes # 0.5  


 


Eosinophils # 0.2  


 


Basophils # 0.0  


 


Nucleated Red Blood Cells # 0.0  


 


Sodium Level 141  


 


Potassium Level 3.9  


 


Chloride Level 106  


 


Carbon Dioxide Level 24  


 


Anion Gap 15  


 


Blood Urea Nitrogen 13  


 


Creatinine 0.78  


 


Glucose Level 160  


 


Calcium Level 8.8  











Medications


Medications





 Current Medications


Amlodipine Besylate (Norvasc) 10 mg DAILY PO  Last administered on 11/12/17at 09

:59; Admin Dose 10 MG;  Start 11/9/17 at 09:00


Aspirin (Halfprin) 81 mg DAILY PO  Last administered on 11/12/17at 09:59; Admin 

Dose 81 MG;  Start 11/9/17 at 09:00


Celecoxib (Celebrex) 100 mg DAILY  PRN PO PAIN Last administered on 11/9/17at 22

:29; Admin Dose 100 MG;  Start 11/9/17 at 01:00


Cholecalciferol 1000 unit 1,000 unit DAILY PO  Last administered on 11/12/17at 

09:59; Admin Dose 1,000 UNIT;  Start 11/9/17 at 09:00


Ceftriaxone Sodium (Rocephin) 50 ml @  100 mls/hr Q12 IVPB  Last administered 

on 11/12/17at 09:59; Admin Dose 100 MLS/HR;  Start 11/9/17 at 09:00


Acetaminophen (Tylenol Tab) 650 mg Q6H  PRN PO PAIN AND OR ELEVATED TEMP Last 

administered on 11/9/17at 08:41; Admin Dose 650 MG;  Start 11/9/17 at 01:00


Ondansetron HCl (Zofran Inj) 4 mg Q6H  PRN IV NAUSEA AND/OR VOMITING;  Start 11/ 9/17 at 01:00


Morphine Sulfate (morphine) 2 mg Q4H  PRN IV PAIN Last administered on 11/9/ 17at 05:37; Admin Dose 2 MG;  Start 11/9/17 at 01:00


Heparin Sodium (Porcine) (Heparin  (5000 Units/0.5 ml)) 5,000 unit BID SC  Last 

administered on 11/11/17at 20:35; Admin Dose 5,000 UNIT;  Start 11/9/17 at 09:00


Tolterodine Tartrate (Detrol La) 4 mg DAILY PO  Last administered on 11/12/17at 

09:59; Admin Dose 4 MG;  Start 11/10/17 at 09:00


EZETIMIBE (Zetia) 10 mg DAILY@21 PO  Last administered on 11/11/17at 20:34; 

Admin Dose 10 MG;  Start 11/9/17 at 21:00


Atorvastatin Calcium (Lipitor) 10 mg DAILY@21 PO  Last administered on 11/11/ 17at 20:34; Admin Dose 10 MG;  Start 11/9/17 at 21:00


Miscellaneous Information Patients own medicat... BID@10,16 XX ;  Start 11/10/

17 at 10:00


Azelastine HCl (Optivar 0.05% Oph) 1 drop BID LEFT EYE  Last administered on 11/ 12/17at 09:59; Admin Dose 1 DROP;  Start 11/12/17 at 09:00











GERALDO JAMISON Nov 12, 2017 18:48

## 2017-11-13 VITALS — SYSTOLIC BLOOD PRESSURE: 121 MMHG | RESPIRATION RATE: 20 BRPM | DIASTOLIC BLOOD PRESSURE: 58 MMHG

## 2017-11-13 VITALS — RESPIRATION RATE: 18 BRPM | SYSTOLIC BLOOD PRESSURE: 114 MMHG | DIASTOLIC BLOOD PRESSURE: 51 MMHG

## 2017-11-13 VITALS — RESPIRATION RATE: 19 BRPM | DIASTOLIC BLOOD PRESSURE: 82 MMHG | SYSTOLIC BLOOD PRESSURE: 133 MMHG

## 2017-11-13 VITALS — RESPIRATION RATE: 18 BRPM | SYSTOLIC BLOOD PRESSURE: 134 MMHG | DIASTOLIC BLOOD PRESSURE: 83 MMHG

## 2017-11-13 RX ADMIN — CEFTRIAXONE SCH MLS/HR: 1 INJECTION, SOLUTION INTRAVENOUS at 08:56

## 2017-11-13 RX ADMIN — ASPIRIN SCH MG: 81 TABLET, COATED ORAL at 08:57

## 2017-11-13 RX ADMIN — HEPARIN SODIUM SCH UNIT: 5000 INJECTION, SOLUTION INTRAVENOUS; SUBCUTANEOUS at 08:56

## 2017-11-13 RX ADMIN — VITAMIN D, TAB 1000IU (100/BT) SCH UNIT: 25 TAB at 08:57

## 2017-11-13 RX ADMIN — TOLTERODINE TARTRATE SCH MG: 4 CAPSULE, EXTENDED RELEASE ORAL at 08:57

## 2017-11-13 RX ADMIN — AMLODIPINE BESYLATE SCH MG: 10 TABLET ORAL at 08:58

## 2017-11-13 RX ADMIN — ATORVASTATIN CALCIUM SCH MG: 10 TABLET, FILM COATED ORAL at 20:31

## 2017-11-13 RX ADMIN — HEPARIN SODIUM SCH UNIT: 5000 INJECTION, SOLUTION INTRAVENOUS; SUBCUTANEOUS at 20:50

## 2017-11-13 RX ADMIN — CEFTRIAXONE SCH MLS/HR: 1 INJECTION, SOLUTION INTRAVENOUS at 20:31

## 2017-11-13 RX ADMIN — EZETIMIBE SCH MG: 10 TABLET ORAL at 20:36

## 2017-11-13 RX ADMIN — HEPARIN SODIUM SCH UNIT: 5000 INJECTION, SOLUTION INTRAVENOUS; SUBCUTANEOUS at 20:39

## 2017-11-13 RX ADMIN — HEPARIN SODIUM SCH UNIT: 5000 INJECTION, SOLUTION INTRAVENOUS; SUBCUTANEOUS at 09:00

## 2017-11-13 NOTE — PN
Date/Time of Note


Date/Time of Note


DATE: 11/13/17 


TIME: 17:24





Assessment/Plan


VTE Prophylaxis


VTE Prophylaxis Intervention:  [facility]





Lines/Catheters


IV Catheter Type (from [facility]):  Saline Lock


Urinary Cath still in place:  No





Assessment/Plan


Chief Complaint/Hosp Course


82 yo male wtih cogntiive impairment presenitng with UTI and sepsis








1.  Sepsis with acute metabolic encephalopathy secondary to UTI-improved


Continue Rocephin





2.  Hypertension


Controlled on meds





3. Dyslipidemia


Continue statin





4.  Debility


Patient has not been ambulating for a while now and wife would like to place 

patient in a nursing home


 arranging for nursing home placement





5.  Conjunctivitis of the left eye


Cipro drops in the left eye


Patient has also been started on Optivar





Prophylaxis: Lovenox


Problems:  





Subjective


24 Hr Interval Summary


Free Text/Dictation


Clinically improved on ceftriaxone


Refusing HSQ





Exam/Review of Systems


Vital Signs


Vitals





 Vital Signs








  Date Time  Temp Pulse Resp B/P Pulse Ox O2 Delivery O2 Flow Rate FiO2


 


11/13/17 14:35 98.8 77 18 114/51 96   


 


11/10/17 20:00      Nasal Cannula 2.0 














 Intake and Output   


 


 11/12/17 11/12/17 11/13/17





 15:00 23:00 07:00


 


Intake Total 50 ml 990 ml 250 ml


 


Output Total   300 ml


 


Balance 50 ml 990 ml -50 ml











Exam


Constitutional:  alert, oriented, well developed


Psych:  nl mood/affect, no complaints


Head:  atraumatic, normocephalic


Eyes:  EOMI, PERRL, nl conjunctiva, nl lids, nl sclera


ENMT:  nl external ears & nose, nl lips & teeth, nl nasal mucosa & septum


Neck:  non-tender, supple


Respiratory:  clear to auscultation, normal air movement


Cardiovascular:  nl pulses, regular rate and rhythm


Gastrointestinal:  nl liver, spleen, non-tender, soft


Musculoskeletal:  nl extremities to inspection, nl gait and stance


Extremities:  normal pulses


Neurological:  CNS II-XII intact, nl mental status, nl speech, nl strength


Skin:  nl turgor, 


   No rash or lesions


Lymph:  nl lymph nodes





Results


Result Diagram:  


11/12/17 0941 11/12/17 0941








Medications


Medications





 Current Medications


Amlodipine Besylate (Norvasc) 10 mg DAILY PO  Last administered on 11/13/17at 08

:58; Admin Dose 10 MG;  Start 11/9/17 at 09:00


Aspirin (Halfprin) 81 mg DAILY PO  Last administered on 11/13/17at 08:57; Admin 

Dose 81 MG;  Start 11/9/17 at 09:00


Celecoxib (Celebrex) 100 mg DAILY  PRN PO PAIN Last administered on 11/9/17at 22

:29; Admin Dose 100 MG;  Start 11/9/17 at 01:00


Cholecalciferol 1000 unit 1,000 unit DAILY PO  Last administered on 11/13/17at 

08:57; Admin Dose 1,000 UNIT;  Start 11/9/17 at 09:00


Ceftriaxone Sodium (Rocephin) 50 ml @  100 mls/hr Q12 IVPB  Last administered 

on 11/13/17at 08:56; Admin Dose 100 MLS/HR;  Start 11/9/17 at 09:00


Acetaminophen (Tylenol Tab) 650 mg Q6H  PRN PO PAIN AND OR ELEVATED TEMP Last 

administered on 11/12/17at 20:33; Admin Dose 650 MG;  Start 11/9/17 at 01:00


Ondansetron HCl (Zofran Inj) 4 mg Q6H  PRN IV NAUSEA AND/OR VOMITING;  Start 11/ 9/17 at 01:00


Morphine Sulfate (morphine) 2 mg Q4H  PRN IV PAIN Last administered on 11/9/ 17at 05:37; Admin Dose 2 MG;  Start 11/9/17 at 01:00


Heparin Sodium (Porcine) (Heparin  (5000 Units/0.5 ml)) 5,000 unit BID SC  Last 

administered on 11/11/17at 20:35; Admin Dose 5,000 UNIT;  Start 11/9/17 at 09:00


Tolterodine Tartrate (Detrol La) 4 mg DAILY PO  Last administered on 11/13/17at 

08:57; Admin Dose 4 MG;  Start 11/10/17 at 09:00


EZETIMIBE (Zetia) 10 mg DAILY@21 PO  Last administered on 11/12/17at 20:33; 

Admin Dose 10 MG;  Start 11/9/17 at 21:00


Atorvastatin Calcium (Lipitor) 10 mg DAILY@21 PO  Last administered on 11/12/ 17at 20:33; Admin Dose 10 MG;  Start 11/9/17 at 21:00


Miscellaneous Information Patients own medicat... BID@10,16 XX ;  Start 11/10/

17 at 10:00


Azelastine HCl (Optivar 0.05% Oph) 1 drop BID LEFT EYE  Last administered on 11/ 13/17at 08:57; Admin Dose 1 DROP;  Start 11/12/17 at 09:00











GREG BETANCOURT MD Nov 13, 2017 17:25

## 2017-11-14 VITALS — DIASTOLIC BLOOD PRESSURE: 80 MMHG | RESPIRATION RATE: 19 BRPM | SYSTOLIC BLOOD PRESSURE: 142 MMHG

## 2017-11-14 VITALS — SYSTOLIC BLOOD PRESSURE: 131 MMHG | RESPIRATION RATE: 18 BRPM | DIASTOLIC BLOOD PRESSURE: 75 MMHG

## 2017-11-14 VITALS — DIASTOLIC BLOOD PRESSURE: 79 MMHG | SYSTOLIC BLOOD PRESSURE: 135 MMHG | RESPIRATION RATE: 22 BRPM

## 2017-11-14 RX ADMIN — HEPARIN SODIUM SCH UNIT: 5000 INJECTION, SOLUTION INTRAVENOUS; SUBCUTANEOUS at 08:36

## 2017-11-14 RX ADMIN — CEFTRIAXONE SCH MLS/HR: 1 INJECTION, SOLUTION INTRAVENOUS at 08:33

## 2017-11-14 RX ADMIN — AMLODIPINE BESYLATE SCH MG: 10 TABLET ORAL at 08:36

## 2017-11-14 RX ADMIN — VITAMIN D, TAB 1000IU (100/BT) SCH UNIT: 25 TAB at 08:35

## 2017-11-14 RX ADMIN — CIPROFLOXACIN HYDROCHLORIDE SCH DROP: 3 SOLUTION/ DROPS OPHTHALMIC at 12:22

## 2017-11-14 RX ADMIN — HEPARIN SODIUM SCH UNIT: 5000 INJECTION, SOLUTION INTRAVENOUS; SUBCUTANEOUS at 08:44

## 2017-11-14 RX ADMIN — CIPROFLOXACIN HYDROCHLORIDE SCH DROP: 3 SOLUTION/ DROPS OPHTHALMIC at 16:37

## 2017-11-14 RX ADMIN — ASPIRIN SCH MG: 81 TABLET, COATED ORAL at 08:36

## 2017-11-14 NOTE — DS
Date/Time of Note


Date/Time of Note


DATE: 11/14/17 


TIME: 18:25





Discharge Summary


Admission/Discharge Info


Admit Date/Time


Nov 8, 2017 at 21:56


Discharge Date/Time


Nov 14, 2017 at 17:15


Discharge Diagnosis


UTI


Patient Condition:  Fair


Hx of Present Illness





This is an 83-year-old male with history of hypertension, dyslipidemia, 

arthritis who was brought to the emergency department complaining of bilateral 

lower extremity swelling, fever and altered mentation.  Patient unable to give 

meaningful history, but reported frequent urination and progressively worsening 

bilateral lower extremity swelling.


When he comes to the ER, he was febrile with a temperature of 100.8, heart rate 

103.  Labs shows a WBC of 16,000 and lactic acid of 2.2.  Urinalysis is 

consistent with UTI.


Hospital Course


82 yo male wtih cogntiive impairment presenitng with UTI and sepsis








Patient was treated for urosepsis with ceftriaxone


He also had a bacterial conjunctivitis which was treated with cipro gtt and 

improved





He was transfered to SNF for further management


Continue Rocephin


Home Meds


Reported Medications


Celecoxib* (Celebrex*) 100 Mg Capsule, 100 MG PO DAILY Y for PAIN, CAP


   11/9/17


Cholecalciferol* (Vitamin D3*) 1,000 Unit Tablet, 1000 UNIT PO DAILY, TAB


   3/6/17


Mirabegron (Myrbetriq) 50 Mg Tab.er.24h, 25 MG PO DAILY, TAB


   3/6/17


Herbal Drugs (Colon Herbal Cleanser) 1 Each Capsule, 1 EACH PO DAILY, CAP


   3/6/17


Aspirin* (Aspirin* EC) 81 Mg Tablet.dr, 81 MG PO DAILY, TAB


   3/6/17


Ezetimibe/Simvastatin (Vytorin 10-20 mg Tablet) 1 Each Tablet, 1 EACH PO DAILY, 

TAB


   3/6/17


Amlodipine Besylate* (Amlodipine Besylate*) 10 Mg Tablet, 10 MG PO DAILY, #30 

TAB


   3/6/17


Metoprolol Succinate* (Toprol XL*) 100 Mg Tab.sr.24h, 100 MG PO DAILY, #30 TAB


   3/6/17


Discontinued Reported Medications


Celecoxib* (Celebrex*) 100 Mg Capsule, 100 MG PO BID, CAP


   3/6/17


[Ultra Supplements]   No Conflict Check, 1 TAB PO DAILY


   3/6/17


Primary Care Provider


GREG Salcido MD Nov 14, 2017 18:26

## 2017-12-29 ENCOUNTER — HOSPITAL ENCOUNTER (INPATIENT)
Age: 82
LOS: 11 days | Discharge: HOME HEALTH | DRG: 872 | End: 2018-01-09

## 2017-12-29 ENCOUNTER — HOSPITAL ENCOUNTER (INPATIENT)
Dept: HOSPITAL 91 - E/R | Age: 82
LOS: 11 days | Discharge: HOME HEALTH | DRG: 872 | End: 2018-01-09
Payer: MEDICARE

## 2017-12-29 DIAGNOSIS — B96.5: ICD-10-CM

## 2017-12-29 DIAGNOSIS — N39.0: ICD-10-CM

## 2017-12-29 DIAGNOSIS — R30.0: ICD-10-CM

## 2017-12-29 DIAGNOSIS — M54.2: ICD-10-CM

## 2017-12-29 DIAGNOSIS — N40.0: ICD-10-CM

## 2017-12-29 DIAGNOSIS — J20.9: ICD-10-CM

## 2017-12-29 DIAGNOSIS — I10: ICD-10-CM

## 2017-12-29 DIAGNOSIS — G47.33: ICD-10-CM

## 2017-12-29 DIAGNOSIS — B95.2: ICD-10-CM

## 2017-12-29 DIAGNOSIS — R06.03: ICD-10-CM

## 2017-12-29 DIAGNOSIS — A41.50: Primary | ICD-10-CM

## 2017-12-29 LAB
ADD MAN DIFF?: NO
ADD UMIC: YES
ALANINE AMINOTRANSFERASE: 37 IU/L (ref 13–69)
ALBUMIN/GLOBULIN RATIO: 1.12
ALBUMIN: 4.5 G/DL (ref 3.3–4.9)
ALKALINE PHOSPHATASE: 86 IU/L (ref 42–121)
ANION GAP: 19 (ref 8–16)
ASPARTATE AMINO TRANSFERASE: 26 IU/L (ref 15–46)
BASOPHIL #: 0.1 10^3/UL (ref 0–0.1)
BASOPHILS %: 0.3 % (ref 0–2)
BILIRUBIN,DIRECT: 0 MG/DL (ref 0–0.2)
BILIRUBIN,TOTAL: 0.5 MG/DL (ref 0.2–1.3)
BLOOD UREA NITROGEN: 15 MG/DL (ref 7–20)
CALCIUM: 9.7 MG/DL (ref 8.4–10.2)
CARBON DIOXIDE: 28 MMOL/L (ref 21–31)
CHLORIDE: 100 MMOL/L (ref 97–110)
CREATININE: 1.04 MG/DL (ref 0.61–1.24)
EOSINOPHILS #: 0 10^3/UL (ref 0–0.5)
EOSINOPHILS %: 0.2 % (ref 0–7)
FREE T4 (FREE THYROXINE): 1.13 NG/DL (ref 0.85–1.93)
GLOBULIN: 4 G/DL (ref 1.3–3.2)
GLUCOSE: 147 MG/DL (ref 70–220)
HEMATOCRIT: 49 % (ref 42–52)
HEMOGLOBIN: 16.6 G/DL (ref 14–18)
INR: 0.96
LACTIC ACID: 0.9 MMOL/L (ref 0.5–2)
LACTIC ACID: 1.7 MMOL/L (ref 0.5–2)
LACTIC ACID: 1.8 MMOL/L (ref 0.5–2)
LACTIC ACID: 2.9 MMOL/L (ref 0.5–2)
LYMPHOCYTES #: 1.4 10^3/UL (ref 0.8–2.9)
LYMPHOCYTES %: 7.8 % (ref 15–51)
MEAN CORPUSCULAR HEMOGLOBIN: 31.4 PG (ref 29–33)
MEAN CORPUSCULAR HGB CONC: 33.9 G/DL (ref 32–37)
MEAN CORPUSCULAR VOLUME: 92.8 FL (ref 82–101)
MEAN PLATELET VOLUME: 9.2 FL (ref 7.4–10.4)
MONOCYTE #: 1 10^3/UL (ref 0.3–0.9)
MONOCYTES %: 5.5 % (ref 0–11)
NEUTROPHIL #: 15.6 10^3/UL (ref 1.6–7.5)
NEUTROPHILS %: 85.5 % (ref 39–77)
NUCLEATED RED BLOOD CELLS #: 0 10^3/UL (ref 0–0)
NUCLEATED RED BLOOD CELLS%: 0 /100WBC (ref 0–0)
PARTIAL THROMBOPLASTIN TIME: 30.7 SEC (ref 25–35)
PLATELET COUNT: 213 10^3/UL (ref 140–415)
POTASSIUM: 4.6 MMOL/L (ref 3.5–5.1)
PROTIME: 12.9 SEC (ref 11.9–14.9)
PT RATIO: 1
RED BLOOD COUNT: 5.28 10^6/UL (ref 4.7–6.1)
RED CELL DISTRIBUTION WIDTH: 13.2 % (ref 11.5–14.5)
SODIUM: 142 MMOL/L (ref 135–144)
TOTAL PROTEIN: 8.5 G/DL (ref 6.1–8.1)
TROPONIN-I: 0.02 NG/ML (ref 0–0.12)
UR AMORPHOUS CRYSTAL: (no result) /HPF
UR ASCORBIC ACID: NEGATIVE MG/DL
UR BACTERIA: (no result) /HPF
UR BILIRUBIN (DIP): NEGATIVE MG/DL
UR BLOOD (DIP): (no result) MG/DL
UR CLARITY: (no result)
UR COLOR: YELLOW
UR GLUCOSE (DIP): NEGATIVE MG/DL
UR KETONES (DIP): (no result) MG/DL
UR LEUKOCYTE ESTERASE (DIP): (no result) LEU/UL
UR NITRITE (DIP): NEGATIVE MG/DL
UR PH (DIP): 7 (ref 5–9)
UR RBC: 15 /HPF (ref 0–5)
UR SPECIFIC GRAVITY (DIP): 1.01 (ref 1–1.03)
UR TOTAL PROTEIN (DIP): (no result) MG/DL
UR UROBILINOGEN (DIP): NEGATIVE MG/DL
UR WBC: > 182 /HPF (ref 0–5)
WHITE BLOOD COUNT: 18.3 10^3/UL (ref 4.8–10.8)

## 2017-12-29 PROCEDURE — 80053 COMPREHEN METABOLIC PANEL: CPT

## 2017-12-29 PROCEDURE — 97003: CPT

## 2017-12-29 PROCEDURE — 36569 INSJ PICC 5 YR+ W/O IMAGING: CPT

## 2017-12-29 PROCEDURE — 96365 THER/PROPH/DIAG IV INF INIT: CPT

## 2017-12-29 PROCEDURE — 84443 ASSAY THYROID STIM HORMONE: CPT

## 2017-12-29 PROCEDURE — 94640 AIRWAY INHALATION TREATMENT: CPT

## 2017-12-29 PROCEDURE — 96375 TX/PRO/DX INJ NEW DRUG ADDON: CPT

## 2017-12-29 PROCEDURE — 84484 ASSAY OF TROPONIN QUANT: CPT

## 2017-12-29 PROCEDURE — 94664 DEMO&/EVAL PT USE INHALER: CPT

## 2017-12-29 PROCEDURE — 83605 ASSAY OF LACTIC ACID: CPT

## 2017-12-29 PROCEDURE — 76937 US GUIDE VASCULAR ACCESS: CPT

## 2017-12-29 PROCEDURE — 99291 CRITICAL CARE FIRST HOUR: CPT

## 2017-12-29 PROCEDURE — 80061 LIPID PANEL: CPT

## 2017-12-29 PROCEDURE — 92610 EVALUATE SWALLOWING FUNCTION: CPT

## 2017-12-29 PROCEDURE — 85730 THROMBOPLASTIN TIME PARTIAL: CPT

## 2017-12-29 PROCEDURE — 93005 ELECTROCARDIOGRAM TRACING: CPT

## 2017-12-29 PROCEDURE — 84100 ASSAY OF PHOSPHORUS: CPT

## 2017-12-29 PROCEDURE — 36415 COLL VENOUS BLD VENIPUNCTURE: CPT

## 2017-12-29 PROCEDURE — 80048 BASIC METABOLIC PNL TOTAL CA: CPT

## 2017-12-29 PROCEDURE — 83036 HEMOGLOBIN GLYCOSYLATED A1C: CPT

## 2017-12-29 PROCEDURE — 81001 URINALYSIS AUTO W/SCOPE: CPT

## 2017-12-29 PROCEDURE — 85610 PROTHROMBIN TIME: CPT

## 2017-12-29 PROCEDURE — 87086 URINE CULTURE/COLONY COUNT: CPT

## 2017-12-29 PROCEDURE — 02HV33Z INSERTION OF INFUSION DEVICE INTO SUPERIOR VENA CAVA, PERCUTANEOUS APPROACH: ICD-10-PCS

## 2017-12-29 PROCEDURE — 92526 ORAL FUNCTION THERAPY: CPT

## 2017-12-29 PROCEDURE — 87081 CULTURE SCREEN ONLY: CPT

## 2017-12-29 PROCEDURE — 71045 X-RAY EXAM CHEST 1 VIEW: CPT

## 2017-12-29 PROCEDURE — 87040 BLOOD CULTURE FOR BACTERIA: CPT

## 2017-12-29 PROCEDURE — 84439 ASSAY OF FREE THYROXINE: CPT

## 2017-12-29 PROCEDURE — 97530 THERAPEUTIC ACTIVITIES: CPT

## 2017-12-29 PROCEDURE — 83735 ASSAY OF MAGNESIUM: CPT

## 2017-12-29 PROCEDURE — 87400 INFLUENZA A/B EACH AG IA: CPT

## 2017-12-29 PROCEDURE — 97110 THERAPEUTIC EXERCISES: CPT

## 2017-12-29 PROCEDURE — 85025 COMPLETE CBC W/AUTO DIFF WBC: CPT

## 2017-12-29 PROCEDURE — 71010: CPT

## 2017-12-29 RX ADMIN — VANCOMYCIN HYDROCHLORIDE 1 MLS/HR: 1 INJECTION, POWDER, LYOPHILIZED, FOR SOLUTION INTRAVENOUS at 11:53

## 2017-12-29 RX ADMIN — ACETAMINOPHEN 1 MG: 325 TABLET, FILM COATED ORAL at 11:12

## 2017-12-29 RX ADMIN — LORAZEPAM 1 MG: 0.5 TABLET ORAL at 17:02

## 2017-12-29 RX ADMIN — CEFEPIME HYDROCHLORIDE 1 MLS/HR: 2 INJECTION, POWDER, FOR SOLUTION INTRAVENOUS at 11:12

## 2017-12-29 RX ADMIN — HYDROCODONE BITARTRATE AND ACETAMINOPHEN 1 TAB: 5; 325 TABLET ORAL at 17:02

## 2017-12-29 RX ADMIN — PIPERACILLIN AND TAZOBACTAM 1 MLS/HR: 3; .375 INJECTION, POWDER, LYOPHILIZED, FOR SOLUTION INTRAVENOUS; PARENTERAL at 21:11

## 2017-12-29 RX ADMIN — POTASSIUM ACETATE 1 MLS/HR: 3.93 INJECTION, SOLUTION, CONCENTRATE INTRAVENOUS at 17:10

## 2017-12-29 RX ADMIN — ACETAMINOPHEN 1 MG: 325 TABLET, FILM COATED ORAL at 17:08

## 2017-12-29 RX ADMIN — VANCOMYCIN HYDROCHLORIDE 1 MLS/HR: 1 INJECTION, POWDER, LYOPHILIZED, FOR SOLUTION INTRAVENOUS at 22:52

## 2017-12-29 RX ADMIN — THIAMINE HYDROCHLORIDE 1 ML: 100 INJECTION, SOLUTION INTRAMUSCULAR; INTRAVENOUS at 11:12

## 2017-12-29 RX ADMIN — ONDANSETRON HYDROCHLORIDE 1 MG: 2 INJECTION, SOLUTION INTRAMUSCULAR; INTRAVENOUS at 11:11

## 2017-12-29 RX ADMIN — THIAMINE HYDROCHLORIDE 1 MLS/HR: 100 INJECTION, SOLUTION INTRAMUSCULAR; INTRAVENOUS at 14:15

## 2017-12-30 LAB
ADD MAN DIFF?: NO
ANION GAP: 19 (ref 8–16)
BASOPHIL #: 0 10^3/UL (ref 0–0.1)
BASOPHILS %: 0.2 % (ref 0–2)
BLOOD UREA NITROGEN: 12 MG/DL (ref 7–20)
CALCIUM: 8.8 MG/DL (ref 8.4–10.2)
CARBON DIOXIDE: 20 MMOL/L (ref 21–31)
CHLORIDE: 105 MMOL/L (ref 97–110)
CHOL/HDL RATIO: 4.2 RATIO
CHOLESTEROL: 156 MG/DL (ref 100–200)
CREATININE: 0.76 MG/DL (ref 0.61–1.24)
EOSINOPHILS #: 0.1 10^3/UL (ref 0–0.5)
EOSINOPHILS %: 0.7 % (ref 0–7)
GLUCOSE: 144 MG/DL (ref 70–220)
HDL CHOLESTEROL: 37 MG/DL (ref 31–75)
HEMATOCRIT: 43.4 % (ref 42–52)
HEMOGLOBIN A1C: 6.3 % (ref 0–5.9)
HEMOGLOBIN: 14.7 G/DL (ref 14–18)
LACTIC ACID: 1.3 MMOL/L (ref 0.5–2)
LACTIC ACID: 1.3 MMOL/L (ref 0.5–2)
LACTIC ACID: 1.4 MMOL/L (ref 0.5–2)
LDL CHOLESTEROL,CALCULATED: 94 MG/DL
LYMPHOCYTES #: 1.2 10^3/UL (ref 0.8–2.9)
LYMPHOCYTES %: 7.7 % (ref 15–51)
MAGNESIUM: 2 MG/DL (ref 1.7–2.5)
MEAN CORPUSCULAR HEMOGLOBIN: 31.3 PG (ref 29–33)
MEAN CORPUSCULAR HGB CONC: 33.9 G/DL (ref 32–37)
MEAN CORPUSCULAR VOLUME: 92.3 FL (ref 82–101)
MEAN PLATELET VOLUME: 9.2 FL (ref 7.4–10.4)
MONOCYTE #: 0.8 10^3/UL (ref 0.3–0.9)
MONOCYTES %: 5.4 % (ref 0–11)
NEUTROPHIL #: 13.4 10^3/UL (ref 1.6–7.5)
NEUTROPHILS %: 85.6 % (ref 39–77)
NUCLEATED RED BLOOD CELLS #: 0 10^3/UL (ref 0–0)
NUCLEATED RED BLOOD CELLS%: 0 /100WBC (ref 0–0)
PHOSPHORUS: 3.2 MG/DL (ref 2.5–4.9)
PLATELET COUNT: 178 10^3/UL (ref 140–415)
POTASSIUM: 4.2 MMOL/L (ref 3.5–5.1)
RED BLOOD COUNT: 4.7 10^6/UL (ref 4.7–6.1)
RED CELL DISTRIBUTION WIDTH: 12.9 % (ref 11.5–14.5)
SODIUM: 140 MMOL/L (ref 135–144)
THYROID STIMULATING HORMONE: 2.71 MIU/L (ref 0.47–4.68)
TRIGLYCERIDES: 125 MG/DL (ref 0–149)
WHITE BLOOD COUNT: 15.6 10^3/UL (ref 4.8–10.8)

## 2017-12-30 RX ADMIN — EZETIMIBE 1 MG: 10 TABLET ORAL at 08:56

## 2017-12-30 RX ADMIN — PIPERACILLIN AND TAZOBACTAM 1 MLS/HR: 3; .375 INJECTION, POWDER, LYOPHILIZED, FOR SOLUTION INTRAVENOUS; PARENTERAL at 02:25

## 2017-12-30 RX ADMIN — PIPERACILLIN AND TAZOBACTAM 1 MLS/HR: 3; .375 INJECTION, POWDER, LYOPHILIZED, FOR SOLUTION INTRAVENOUS; PARENTERAL at 17:31

## 2017-12-30 RX ADMIN — VANCOMYCIN HYDROCHLORIDE 1 MLS/HR: 1 INJECTION, POWDER, LYOPHILIZED, FOR SOLUTION INTRAVENOUS at 20:30

## 2017-12-30 RX ADMIN — ASPIRIN 1 MG: 81 TABLET, COATED ORAL at 08:56

## 2017-12-30 RX ADMIN — VITAMIN D, TAB 1000IU (100/BT) 1 UNIT: 25 TAB at 08:56

## 2017-12-30 RX ADMIN — POTASSIUM ACETATE 1 MLS/HR: 3.93 INJECTION, SOLUTION, CONCENTRATE INTRAVENOUS at 02:25

## 2017-12-30 RX ADMIN — PIPERACILLIN AND TAZOBACTAM 1 MLS/HR: 3; .375 INJECTION, POWDER, LYOPHILIZED, FOR SOLUTION INTRAVENOUS; PARENTERAL at 12:13

## 2017-12-30 RX ADMIN — ATORVASTATIN CALCIUM 1 MG: 10 TABLET, FILM COATED ORAL at 08:56

## 2017-12-30 RX ADMIN — METOPROLOL SUCCINATE 1 MG: 100 TABLET, EXTENDED RELEASE ORAL at 08:56

## 2017-12-30 RX ADMIN — PANTOPRAZOLE SODIUM 1 MG: 40 TABLET, DELAYED RELEASE ORAL at 05:33

## 2017-12-30 RX ADMIN — POTASSIUM ACETATE 1 MLS/HR: 3.93 INJECTION, SOLUTION, CONCENTRATE INTRAVENOUS at 15:05

## 2017-12-30 RX ADMIN — GUAIFENESIN 1 MG: 100 SOLUTION ORAL at 17:31

## 2017-12-30 RX ADMIN — PIPERACILLIN AND TAZOBACTAM 1 MLS/HR: 3; .375 INJECTION, POWDER, LYOPHILIZED, FOR SOLUTION INTRAVENOUS; PARENTERAL at 05:32

## 2017-12-30 RX ADMIN — IPRATROPIUM BROMIDE AND ALBUTEROL SULFATE 1 ML: .5; 3 SOLUTION RESPIRATORY (INHALATION) at 15:55

## 2017-12-30 RX ADMIN — PROMETHAZINE HYDROCHLORIDE AND CODEINE PHOSPHATE 1 ML: 6.25; 1 SOLUTION ORAL at 12:13

## 2017-12-30 RX ADMIN — IPRATROPIUM BROMIDE AND ALBUTEROL SULFATE 1 ML: .5; 3 SOLUTION RESPIRATORY (INHALATION) at 23:26

## 2017-12-30 RX ADMIN — AMLODIPINE BESYLATE 1 MG: 10 TABLET ORAL at 08:56

## 2017-12-30 RX ADMIN — POTASSIUM ACETATE 1 MLS/HR: 3.93 INJECTION, SOLUTION, CONCENTRATE INTRAVENOUS at 17:39

## 2017-12-31 LAB
ADD MAN DIFF?: NO
ANION GAP: 13 (ref 8–16)
BASOPHIL #: 0 10^3/UL (ref 0–0.1)
BASOPHILS %: 0.3 % (ref 0–2)
BLOOD UREA NITROGEN: 13 MG/DL (ref 7–20)
CALCIUM: 8.5 MG/DL (ref 8.4–10.2)
CARBON DIOXIDE: 24 MMOL/L (ref 21–31)
CHLORIDE: 102 MMOL/L (ref 97–110)
CREATININE: 0.89 MG/DL (ref 0.61–1.24)
EOSINOPHILS #: 0.2 10^3/UL (ref 0–0.5)
EOSINOPHILS %: 1.4 % (ref 0–7)
GLUCOSE: 117 MG/DL (ref 70–220)
HEMATOCRIT: 42.1 % (ref 42–52)
HEMOGLOBIN: 14.5 G/DL (ref 14–18)
LYMPHOCYTES #: 1.3 10^3/UL (ref 0.8–2.9)
LYMPHOCYTES %: 12.4 % (ref 15–51)
MAGNESIUM: 2.1 MG/DL (ref 1.7–2.5)
MEAN CORPUSCULAR HEMOGLOBIN: 31.4 PG (ref 29–33)
MEAN CORPUSCULAR HGB CONC: 34.4 G/DL (ref 32–37)
MEAN CORPUSCULAR VOLUME: 91.1 FL (ref 82–101)
MEAN PLATELET VOLUME: 9.3 FL (ref 7.4–10.4)
MONOCYTE #: 0.8 10^3/UL (ref 0.3–0.9)
MONOCYTES %: 7.8 % (ref 0–11)
NEUTROPHIL #: 8.1 10^3/UL (ref 1.6–7.5)
NEUTROPHILS %: 77.6 % (ref 39–77)
NUCLEATED RED BLOOD CELLS #: 0 10^3/UL (ref 0–0)
NUCLEATED RED BLOOD CELLS%: 0 /100WBC (ref 0–0)
PLATELET COUNT: 175 10^3/UL (ref 140–415)
POTASSIUM: 3.4 MMOL/L (ref 3.5–5.1)
RED BLOOD COUNT: 4.62 10^6/UL (ref 4.7–6.1)
RED CELL DISTRIBUTION WIDTH: 13.2 % (ref 11.5–14.5)
SODIUM: 136 MMOL/L (ref 135–144)
THYROID STIMULATING HORMONE: 2.08 MIU/L (ref 0.47–4.68)
WHITE BLOOD COUNT: 10.4 10^3/UL (ref 4.8–10.8)

## 2017-12-31 RX ADMIN — LORAZEPAM 1 MG: 0.5 TABLET ORAL at 14:49

## 2017-12-31 RX ADMIN — VITAMIN D, TAB 1000IU (100/BT) 1 UNIT: 25 TAB at 09:04

## 2017-12-31 RX ADMIN — PIPERACILLIN AND TAZOBACTAM 1 MLS/HR: 3; .375 INJECTION, POWDER, LYOPHILIZED, FOR SOLUTION INTRAVENOUS; PARENTERAL at 12:23

## 2017-12-31 RX ADMIN — METOPROLOL SUCCINATE 1 MG: 100 TABLET, EXTENDED RELEASE ORAL at 09:03

## 2017-12-31 RX ADMIN — IPRATROPIUM BROMIDE AND ALBUTEROL SULFATE 1 ML: .5; 3 SOLUTION RESPIRATORY (INHALATION) at 11:08

## 2017-12-31 RX ADMIN — IPRATROPIUM BROMIDE AND ALBUTEROL SULFATE 1 ML: .5; 3 SOLUTION RESPIRATORY (INHALATION) at 19:49

## 2017-12-31 RX ADMIN — EZETIMIBE 1 MG: 10 TABLET ORAL at 09:04

## 2017-12-31 RX ADMIN — PIPERACILLIN AND TAZOBACTAM 1 MLS/HR: 3; .375 INJECTION, POWDER, LYOPHILIZED, FOR SOLUTION INTRAVENOUS; PARENTERAL at 05:12

## 2017-12-31 RX ADMIN — AMLODIPINE BESYLATE 1 MG: 10 TABLET ORAL at 09:04

## 2017-12-31 RX ADMIN — GUAIFENESIN 1 MG: 100 SOLUTION ORAL at 02:51

## 2017-12-31 RX ADMIN — PANTOPRAZOLE SODIUM 1 MG: 40 TABLET, DELAYED RELEASE ORAL at 05:12

## 2017-12-31 RX ADMIN — MEROPENEM 1 MLS/HR: 1 INJECTION, POWDER, FOR SOLUTION INTRAVENOUS at 21:18

## 2017-12-31 RX ADMIN — VANCOMYCIN HYDROCHLORIDE 1 MLS/HR: 1 INJECTION, POWDER, LYOPHILIZED, FOR SOLUTION INTRAVENOUS at 23:20

## 2017-12-31 RX ADMIN — ASPIRIN 1 MG: 81 TABLET, COATED ORAL at 09:04

## 2017-12-31 RX ADMIN — PIPERACILLIN AND TAZOBACTAM 1 MLS/HR: 3; .375 INJECTION, POWDER, LYOPHILIZED, FOR SOLUTION INTRAVENOUS; PARENTERAL at 00:33

## 2017-12-31 RX ADMIN — ATORVASTATIN CALCIUM 1 MG: 10 TABLET, FILM COATED ORAL at 09:04

## 2018-01-01 LAB
ADD MAN DIFF?: NO
ANION GAP: 12 (ref 8–16)
BASOPHIL #: 0 10^3/UL (ref 0–0.1)
BASOPHILS %: 0.4 % (ref 0–2)
BLOOD UREA NITROGEN: 12 MG/DL (ref 7–20)
CALCIUM: 8.5 MG/DL (ref 8.4–10.2)
CARBON DIOXIDE: 27 MMOL/L (ref 21–31)
CHLORIDE: 102 MMOL/L (ref 97–110)
CREATININE: 0.81 MG/DL (ref 0.61–1.24)
EOSINOPHILS #: 0.2 10^3/UL (ref 0–0.5)
EOSINOPHILS %: 1.9 % (ref 0–7)
GLUCOSE: 116 MG/DL (ref 70–220)
HEMATOCRIT: 38.8 % (ref 42–52)
HEMOGLOBIN: 13.7 G/DL (ref 14–18)
LYMPHOCYTES #: 1.3 10^3/UL (ref 0.8–2.9)
LYMPHOCYTES %: 14.5 % (ref 15–51)
MEAN CORPUSCULAR HEMOGLOBIN: 31.9 PG (ref 29–33)
MEAN CORPUSCULAR HGB CONC: 35.3 G/DL (ref 32–37)
MEAN CORPUSCULAR VOLUME: 90.2 FL (ref 82–101)
MEAN PLATELET VOLUME: 9.5 FL (ref 7.4–10.4)
MONOCYTE #: 0.7 10^3/UL (ref 0.3–0.9)
MONOCYTES %: 8.1 % (ref 0–11)
NEUTROPHIL #: 6.6 10^3/UL (ref 1.6–7.5)
NEUTROPHILS %: 74.5 % (ref 39–77)
NUCLEATED RED BLOOD CELLS #: 0 10^3/UL (ref 0–0)
NUCLEATED RED BLOOD CELLS%: 0 /100WBC (ref 0–0)
PLATELET COUNT: 220 10^3/UL (ref 140–415)
POTASSIUM: 3.3 MMOL/L (ref 3.5–5.1)
RED BLOOD COUNT: 4.3 10^6/UL (ref 4.7–6.1)
RED CELL DISTRIBUTION WIDTH: 12.8 % (ref 11.5–14.5)
SODIUM: 138 MMOL/L (ref 135–144)
WHITE BLOOD COUNT: 8.9 10^3/UL (ref 4.8–10.8)

## 2018-01-01 RX ADMIN — ATORVASTATIN CALCIUM 1 MG: 10 TABLET, FILM COATED ORAL at 09:09

## 2018-01-01 RX ADMIN — MEROPENEM 1 MLS/HR: 1 INJECTION, POWDER, FOR SOLUTION INTRAVENOUS at 09:09

## 2018-01-01 RX ADMIN — MEROPENEM 1 MLS/HR: 1 INJECTION, POWDER, FOR SOLUTION INTRAVENOUS at 21:15

## 2018-01-01 RX ADMIN — POTASSIUM CHLORIDE 1 MLS/HR: 200 INJECTION, SOLUTION INTRAVENOUS at 15:05

## 2018-01-01 RX ADMIN — PANTOPRAZOLE SODIUM 1 MG: 40 TABLET, DELAYED RELEASE ORAL at 07:51

## 2018-01-01 RX ADMIN — POTASSIUM CHLORIDE 1 MLS/HR: 200 INJECTION, SOLUTION INTRAVENOUS at 15:08

## 2018-01-01 RX ADMIN — METOPROLOL SUCCINATE 1 MG: 100 TABLET, EXTENDED RELEASE ORAL at 09:09

## 2018-01-01 RX ADMIN — IPRATROPIUM BROMIDE AND ALBUTEROL SULFATE 1 ML: .5; 3 SOLUTION RESPIRATORY (INHALATION) at 20:00

## 2018-01-01 RX ADMIN — IPRATROPIUM BROMIDE AND ALBUTEROL SULFATE 1 ML: .5; 3 SOLUTION RESPIRATORY (INHALATION) at 08:30

## 2018-01-01 RX ADMIN — AMLODIPINE BESYLATE 1 MG: 10 TABLET ORAL at 09:09

## 2018-01-01 RX ADMIN — POTASSIUM CHLORIDE 1 MLS/HR: 200 INJECTION, SOLUTION INTRAVENOUS at 13:41

## 2018-01-01 RX ADMIN — HYDROCODONE BITARTRATE AND ACETAMINOPHEN 1 TAB: 5; 325 TABLET ORAL at 04:23

## 2018-01-01 RX ADMIN — HYDROCODONE BITARTRATE AND ACETAMINOPHEN 1 TAB: 5; 325 TABLET ORAL at 11:34

## 2018-01-01 RX ADMIN — VITAMIN D, TAB 1000IU (100/BT) 1 UNIT: 25 TAB at 09:09

## 2018-01-01 RX ADMIN — EZETIMIBE 1 MG: 10 TABLET ORAL at 09:08

## 2018-01-01 RX ADMIN — ASPIRIN 1 MG: 81 TABLET, COATED ORAL at 09:09

## 2018-01-01 RX ADMIN — LORAZEPAM 1 MG: 0.5 TABLET ORAL at 21:11

## 2018-01-01 RX ADMIN — LORAZEPAM 1 MG: 0.5 TABLET ORAL at 11:33

## 2018-01-02 LAB
ADD MAN DIFF?: NO
ANION GAP: 16 (ref 8–16)
BASOPHIL #: 0.1 10^3/UL (ref 0–0.1)
BASOPHILS %: 0.6 % (ref 0–2)
BLOOD UREA NITROGEN: 13 MG/DL (ref 7–20)
CALCIUM: 8.8 MG/DL (ref 8.4–10.2)
CARBON DIOXIDE: 27 MMOL/L (ref 21–31)
CHLORIDE: 104 MMOL/L (ref 97–110)
CREATININE: 0.83 MG/DL (ref 0.61–1.24)
EOSINOPHILS #: 0.2 10^3/UL (ref 0–0.5)
EOSINOPHILS %: 1.8 % (ref 0–7)
GLUCOSE: 142 MG/DL (ref 70–220)
HEMATOCRIT: 44.5 % (ref 42–52)
HEMOGLOBIN: 15.3 G/DL (ref 14–18)
LYMPHOCYTES #: 1.3 10^3/UL (ref 0.8–2.9)
LYMPHOCYTES %: 13.4 % (ref 15–51)
MAGNESIUM: 2.2 MG/DL (ref 1.7–2.5)
MEAN CORPUSCULAR HEMOGLOBIN: 31.3 PG (ref 29–33)
MEAN CORPUSCULAR HGB CONC: 34.4 G/DL (ref 32–37)
MEAN CORPUSCULAR VOLUME: 91 FL (ref 82–101)
MEAN PLATELET VOLUME: 9.2 FL (ref 7.4–10.4)
MONOCYTE #: 0.6 10^3/UL (ref 0.3–0.9)
MONOCYTES %: 6.5 % (ref 0–11)
NEUTROPHIL #: 7.3 10^3/UL (ref 1.6–7.5)
NEUTROPHILS %: 77.1 % (ref 39–77)
NUCLEATED RED BLOOD CELLS #: 0 10^3/UL (ref 0–0)
NUCLEATED RED BLOOD CELLS%: 0 /100WBC (ref 0–0)
PHOSPHORUS: 3.1 MG/DL (ref 2.5–4.9)
PLATELET COUNT: 222 10^3/UL (ref 140–415)
POTASSIUM: 3.5 MMOL/L (ref 3.5–5.1)
RED BLOOD COUNT: 4.89 10^6/UL (ref 4.7–6.1)
RED CELL DISTRIBUTION WIDTH: 12.8 % (ref 11.5–14.5)
SODIUM: 143 MMOL/L (ref 135–144)
WHITE BLOOD COUNT: 9.5 10^3/UL (ref 4.8–10.8)

## 2018-01-02 RX ADMIN — ATORVASTATIN CALCIUM 1 MG: 10 TABLET, FILM COATED ORAL at 09:10

## 2018-01-02 RX ADMIN — HYDROCODONE BITARTRATE AND ACETAMINOPHEN 1 TAB: 5; 325 TABLET ORAL at 23:47

## 2018-01-02 RX ADMIN — MEROPENEM 1 MLS/HR: 1 INJECTION, POWDER, FOR SOLUTION INTRAVENOUS at 09:19

## 2018-01-02 RX ADMIN — DOCUSATE SODIUM 1 MG: 100 CAPSULE, LIQUID FILLED ORAL at 20:17

## 2018-01-02 RX ADMIN — AMLODIPINE BESYLATE 1 MG: 10 TABLET ORAL at 09:10

## 2018-01-02 RX ADMIN — LORAZEPAM 1 MG: 0.5 TABLET ORAL at 20:17

## 2018-01-02 RX ADMIN — PANTOPRAZOLE SODIUM 1 MG: 40 TABLET, DELAYED RELEASE ORAL at 05:20

## 2018-01-02 RX ADMIN — MAGNESIUM HYDROXIDE 1 ML: 400 SUSPENSION ORAL at 13:10

## 2018-01-02 RX ADMIN — MEROPENEM 1 MLS/HR: 1 INJECTION, POWDER, FOR SOLUTION INTRAVENOUS at 22:39

## 2018-01-02 RX ADMIN — IPRATROPIUM BROMIDE AND ALBUTEROL SULFATE 1 ML: .5; 3 SOLUTION RESPIRATORY (INHALATION) at 08:39

## 2018-01-02 RX ADMIN — ASPIRIN 1 MG: 81 TABLET, COATED ORAL at 09:10

## 2018-01-02 RX ADMIN — IPRATROPIUM BROMIDE AND ALBUTEROL SULFATE 1 ML: .5; 3 SOLUTION RESPIRATORY (INHALATION) at 20:47

## 2018-01-02 RX ADMIN — METOPROLOL SUCCINATE 1 MG: 100 TABLET, EXTENDED RELEASE ORAL at 09:10

## 2018-01-02 RX ADMIN — VITAMIN D, TAB 1000IU (100/BT) 1 UNIT: 25 TAB at 09:09

## 2018-01-02 RX ADMIN — EZETIMIBE 1 MG: 10 TABLET ORAL at 09:09

## 2018-01-03 RX ADMIN — IPRATROPIUM BROMIDE AND ALBUTEROL SULFATE 1 ML: .5; 3 SOLUTION RESPIRATORY (INHALATION) at 08:20

## 2018-01-03 RX ADMIN — ATORVASTATIN CALCIUM 1 MG: 10 TABLET, FILM COATED ORAL at 08:59

## 2018-01-03 RX ADMIN — MEROPENEM 1 MLS/HR: 1 INJECTION, POWDER, FOR SOLUTION INTRAVENOUS at 09:00

## 2018-01-03 RX ADMIN — AMLODIPINE BESYLATE 1 MG: 10 TABLET ORAL at 09:00

## 2018-01-03 RX ADMIN — MEROPENEM 1 MLS/HR: 1 INJECTION, POWDER, FOR SOLUTION INTRAVENOUS at 21:36

## 2018-01-03 RX ADMIN — ASPIRIN 1 MG: 81 TABLET, COATED ORAL at 08:59

## 2018-01-03 RX ADMIN — IPRATROPIUM BROMIDE AND ALBUTEROL SULFATE 1 ML: .5; 3 SOLUTION RESPIRATORY (INHALATION) at 19:26

## 2018-01-03 RX ADMIN — METOPROLOL SUCCINATE 1 MG: 100 TABLET, EXTENDED RELEASE ORAL at 09:00

## 2018-01-03 RX ADMIN — LORAZEPAM 1 MG: 0.5 TABLET ORAL at 07:31

## 2018-01-03 RX ADMIN — VITAMIN D, TAB 1000IU (100/BT) 1 UNIT: 25 TAB at 08:59

## 2018-01-03 RX ADMIN — PANTOPRAZOLE SODIUM 1 MG: 40 TABLET, DELAYED RELEASE ORAL at 05:28

## 2018-01-03 RX ADMIN — EZETIMIBE 1 MG: 10 TABLET ORAL at 08:59

## 2018-01-03 RX ADMIN — DOCUSATE SODIUM 1 MG: 100 CAPSULE, LIQUID FILLED ORAL at 08:59

## 2018-01-04 LAB
ADD MAN DIFF?: NO
ANION GAP: 16 (ref 8–16)
BASOPHIL #: 0.1 10^3/UL (ref 0–0.1)
BASOPHILS %: 0.6 % (ref 0–2)
BLOOD UREA NITROGEN: 12 MG/DL (ref 7–20)
CALCIUM: 8.7 MG/DL (ref 8.4–10.2)
CARBON DIOXIDE: 29 MMOL/L (ref 21–31)
CHLORIDE: 102 MMOL/L (ref 97–110)
CREATININE: 0.82 MG/DL (ref 0.61–1.24)
EOSINOPHILS #: 0.2 10^3/UL (ref 0–0.5)
EOSINOPHILS %: 1.9 % (ref 0–7)
GLUCOSE: 120 MG/DL (ref 70–220)
HEMATOCRIT: 40.6 % (ref 42–52)
HEMOGLOBIN: 13.8 G/DL (ref 14–18)
LYMPHOCYTES #: 1.5 10^3/UL (ref 0.8–2.9)
LYMPHOCYTES %: 14.8 % (ref 15–51)
MAGNESIUM: 2.2 MG/DL (ref 1.7–2.5)
MEAN CORPUSCULAR HEMOGLOBIN: 31.4 PG (ref 29–33)
MEAN CORPUSCULAR HGB CONC: 34 G/DL (ref 32–37)
MEAN CORPUSCULAR VOLUME: 92.3 FL (ref 82–101)
MEAN PLATELET VOLUME: 9.5 FL (ref 7.4–10.4)
MONOCYTE #: 0.7 10^3/UL (ref 0.3–0.9)
MONOCYTES %: 7.4 % (ref 0–11)
NEUTROPHIL #: 7.4 10^3/UL (ref 1.6–7.5)
NEUTROPHILS %: 74.3 % (ref 39–77)
NUCLEATED RED BLOOD CELLS #: 0 10^3/UL (ref 0–0)
NUCLEATED RED BLOOD CELLS%: 0 /100WBC (ref 0–0)
PHOSPHORUS: 3.8 MG/DL (ref 2.5–4.9)
PLATELET COUNT: 268 10^3/UL (ref 140–415)
POTASSIUM: 3.6 MMOL/L (ref 3.5–5.1)
RED BLOOD COUNT: 4.4 10^6/UL (ref 4.7–6.1)
RED CELL DISTRIBUTION WIDTH: 13 % (ref 11.5–14.5)
SODIUM: 143 MMOL/L (ref 135–144)
WHITE BLOOD COUNT: 10 10^3/UL (ref 4.8–10.8)

## 2018-01-04 RX ADMIN — ATORVASTATIN CALCIUM 1 MG: 10 TABLET, FILM COATED ORAL at 09:05

## 2018-01-04 RX ADMIN — METOPROLOL SUCCINATE 1 MG: 100 TABLET, EXTENDED RELEASE ORAL at 09:06

## 2018-01-04 RX ADMIN — EZETIMIBE 1 MG: 10 TABLET ORAL at 09:05

## 2018-01-04 RX ADMIN — LORAZEPAM 1 MG: 0.5 TABLET ORAL at 09:06

## 2018-01-04 RX ADMIN — IPRATROPIUM BROMIDE AND ALBUTEROL SULFATE 1 ML: .5; 3 SOLUTION RESPIRATORY (INHALATION) at 20:29

## 2018-01-04 RX ADMIN — PANTOPRAZOLE SODIUM 1 MG: 40 TABLET, DELAYED RELEASE ORAL at 05:25

## 2018-01-04 RX ADMIN — GUAIFENESIN 1 MG: 100 SOLUTION ORAL at 12:22

## 2018-01-04 RX ADMIN — ASPIRIN 1 MG: 81 TABLET, COATED ORAL at 09:05

## 2018-01-04 RX ADMIN — IPRATROPIUM BROMIDE AND ALBUTEROL SULFATE 1 ML: .5; 3 SOLUTION RESPIRATORY (INHALATION) at 10:05

## 2018-01-04 RX ADMIN — MEROPENEM 1 MLS/HR: 1 INJECTION, POWDER, FOR SOLUTION INTRAVENOUS at 09:04

## 2018-01-04 RX ADMIN — AMLODIPINE BESYLATE 1 MG: 10 TABLET ORAL at 09:06

## 2018-01-04 RX ADMIN — MEROPENEM 1 MLS/HR: 1 INJECTION, POWDER, FOR SOLUTION INTRAVENOUS at 20:43

## 2018-01-04 RX ADMIN — LORAZEPAM 1 MG: 0.5 TABLET ORAL at 21:55

## 2018-01-04 RX ADMIN — HYDROCODONE BITARTRATE AND ACETAMINOPHEN 1 TAB: 5; 325 TABLET ORAL at 00:33

## 2018-01-04 RX ADMIN — VITAMIN D, TAB 1000IU (100/BT) 1 UNIT: 25 TAB at 09:05

## 2018-01-04 RX ADMIN — LORAZEPAM 1 MG: 0.5 TABLET ORAL at 01:25

## 2018-01-04 RX ADMIN — HYDROCODONE BITARTRATE AND ACETAMINOPHEN 1 TAB: 5; 325 TABLET ORAL at 20:49

## 2018-01-05 RX ADMIN — LIDOCAINE HYDROCHLORIDE 1 ML: 10 INJECTION, SOLUTION EPIDURAL; INFILTRATION; INTRACAUDAL; PERINEURAL at 10:00

## 2018-01-05 RX ADMIN — MEROPENEM 1 MLS/HR: 1 INJECTION, POWDER, FOR SOLUTION INTRAVENOUS at 09:30

## 2018-01-05 RX ADMIN — ATORVASTATIN CALCIUM 1 MG: 10 TABLET, FILM COATED ORAL at 09:26

## 2018-01-05 RX ADMIN — IPRATROPIUM BROMIDE AND ALBUTEROL SULFATE 1 ML: .5; 3 SOLUTION RESPIRATORY (INHALATION) at 07:54

## 2018-01-05 RX ADMIN — PANTOPRAZOLE SODIUM 1 MG: 40 TABLET, DELAYED RELEASE ORAL at 05:29

## 2018-01-05 RX ADMIN — IPRATROPIUM BROMIDE AND ALBUTEROL SULFATE 1 ML: .5; 3 SOLUTION RESPIRATORY (INHALATION) at 20:05

## 2018-01-05 RX ADMIN — VITAMIN D, TAB 1000IU (100/BT) 1 UNIT: 25 TAB at 09:27

## 2018-01-05 RX ADMIN — HYDROCODONE BITARTRATE AND ACETAMINOPHEN 1 TAB: 5; 325 TABLET ORAL at 02:21

## 2018-01-05 RX ADMIN — ASPIRIN 1 MG: 81 TABLET, COATED ORAL at 09:27

## 2018-01-05 RX ADMIN — PROMETHAZINE HYDROCHLORIDE AND CODEINE PHOSPHATE 1 ML: 6.25; 1 SOLUTION ORAL at 02:54

## 2018-01-05 RX ADMIN — AMLODIPINE BESYLATE 1 MG: 10 TABLET ORAL at 09:27

## 2018-01-05 RX ADMIN — EZETIMIBE 1 MG: 10 TABLET ORAL at 09:27

## 2018-01-05 RX ADMIN — METOPROLOL SUCCINATE 1 MG: 100 TABLET, EXTENDED RELEASE ORAL at 09:27

## 2018-01-06 RX ADMIN — VITAMIN D, TAB 1000IU (100/BT) 1 UNIT: 25 TAB at 09:51

## 2018-01-06 RX ADMIN — PANTOPRAZOLE SODIUM 1 MG: 40 TABLET, DELAYED RELEASE ORAL at 05:19

## 2018-01-06 RX ADMIN — MEROPENEM 1 MLS/HR: 1 INJECTION, POWDER, FOR SOLUTION INTRAVENOUS at 00:39

## 2018-01-06 RX ADMIN — MEROPENEM 1 MLS/HR: 1 INJECTION, POWDER, FOR SOLUTION INTRAVENOUS at 09:54

## 2018-01-06 RX ADMIN — EZETIMIBE 1 MG: 10 TABLET ORAL at 09:51

## 2018-01-06 RX ADMIN — METOPROLOL SUCCINATE 1 MG: 100 TABLET, EXTENDED RELEASE ORAL at 09:50

## 2018-01-06 RX ADMIN — MORPHINE SULFATE 1 MG: 10 SOLUTION ORAL at 05:28

## 2018-01-06 RX ADMIN — ATORVASTATIN CALCIUM 1 MG: 10 TABLET, FILM COATED ORAL at 09:52

## 2018-01-06 RX ADMIN — IPRATROPIUM BROMIDE AND ALBUTEROL SULFATE 1 ML: .5; 3 SOLUTION RESPIRATORY (INHALATION) at 08:05

## 2018-01-06 RX ADMIN — LORAZEPAM 1 MG: 2 INJECTION, SOLUTION INTRAMUSCULAR; INTRAVENOUS at 11:49

## 2018-01-06 RX ADMIN — IPRATROPIUM BROMIDE AND ALBUTEROL SULFATE 1 ML: .5; 3 SOLUTION RESPIRATORY (INHALATION) at 20:47

## 2018-01-06 RX ADMIN — AMLODIPINE BESYLATE 1 MG: 10 TABLET ORAL at 09:50

## 2018-01-06 RX ADMIN — ASPIRIN 1 MG: 81 TABLET, COATED ORAL at 09:51

## 2018-01-06 RX ADMIN — MEROPENEM 1 MLS/HR: 1 INJECTION, POWDER, FOR SOLUTION INTRAVENOUS at 21:31

## 2018-01-07 LAB
ADD MAN DIFF?: NO
ANION GAP: 17 (ref 8–16)
BASOPHIL #: 0.1 10^3/UL (ref 0–0.1)
BASOPHILS %: 0.4 % (ref 0–2)
BLOOD UREA NITROGEN: 19 MG/DL (ref 7–20)
CALCIUM: 8.7 MG/DL (ref 8.4–10.2)
CARBON DIOXIDE: 26 MMOL/L (ref 21–31)
CHLORIDE: 103 MMOL/L (ref 97–110)
CREATININE: 0.85 MG/DL (ref 0.61–1.24)
EOSINOPHILS #: 0.3 10^3/UL (ref 0–0.5)
EOSINOPHILS %: 2.2 % (ref 0–7)
GLUCOSE: 106 MG/DL (ref 70–220)
HEMATOCRIT: 41.2 % (ref 42–52)
HEMOGLOBIN: 13.7 G/DL (ref 14–18)
LYMPHOCYTES #: 1.8 10^3/UL (ref 0.8–2.9)
LYMPHOCYTES %: 15.2 % (ref 15–51)
MAGNESIUM: 2.2 MG/DL (ref 1.7–2.5)
MEAN CORPUSCULAR HEMOGLOBIN: 31 PG (ref 29–33)
MEAN CORPUSCULAR HGB CONC: 33.3 G/DL (ref 32–37)
MEAN CORPUSCULAR VOLUME: 93.2 FL (ref 82–101)
MEAN PLATELET VOLUME: 9.6 FL (ref 7.4–10.4)
MONOCYTE #: 0.8 10^3/UL (ref 0.3–0.9)
MONOCYTES %: 7 % (ref 0–11)
NEUTROPHIL #: 8.7 10^3/UL (ref 1.6–7.5)
NEUTROPHILS %: 74.5 % (ref 39–77)
NUCLEATED RED BLOOD CELLS #: 0 10^3/UL (ref 0–0)
NUCLEATED RED BLOOD CELLS%: 0 /100WBC (ref 0–0)
PHOSPHORUS: 3.3 MG/DL (ref 2.5–4.9)
PLATELET COUNT: 295 10^3/UL (ref 140–415)
POTASSIUM: 4 MMOL/L (ref 3.5–5.1)
RED BLOOD COUNT: 4.42 10^6/UL (ref 4.7–6.1)
RED CELL DISTRIBUTION WIDTH: 12.9 % (ref 11.5–14.5)
SODIUM: 142 MMOL/L (ref 135–144)
WHITE BLOOD COUNT: 11.6 10^3/UL (ref 4.8–10.8)

## 2018-01-07 RX ADMIN — MEROPENEM 1 MLS/HR: 1 INJECTION, POWDER, FOR SOLUTION INTRAVENOUS at 20:42

## 2018-01-07 RX ADMIN — ATORVASTATIN CALCIUM 1 MG: 10 TABLET, FILM COATED ORAL at 09:47

## 2018-01-07 RX ADMIN — AMLODIPINE BESYLATE 1 MG: 10 TABLET ORAL at 09:48

## 2018-01-07 RX ADMIN — IPRATROPIUM BROMIDE AND ALBUTEROL SULFATE 1 ML: .5; 3 SOLUTION RESPIRATORY (INHALATION) at 19:39

## 2018-01-07 RX ADMIN — VITAMIN D, TAB 1000IU (100/BT) 1 UNIT: 25 TAB at 09:47

## 2018-01-07 RX ADMIN — PANTOPRAZOLE SODIUM 1 MG: 40 TABLET, DELAYED RELEASE ORAL at 05:44

## 2018-01-07 RX ADMIN — ASPIRIN 1 MG: 81 TABLET, COATED ORAL at 09:47

## 2018-01-07 RX ADMIN — EZETIMIBE 1 MG: 10 TABLET ORAL at 09:48

## 2018-01-07 RX ADMIN — METOPROLOL SUCCINATE 1 MG: 100 TABLET, EXTENDED RELEASE ORAL at 09:48

## 2018-01-07 RX ADMIN — MEROPENEM 1 MLS/HR: 1 INJECTION, POWDER, FOR SOLUTION INTRAVENOUS at 09:47

## 2018-01-07 RX ADMIN — IPRATROPIUM BROMIDE AND ALBUTEROL SULFATE 1 ML: .5; 3 SOLUTION RESPIRATORY (INHALATION) at 09:12

## 2018-01-08 LAB
ADD MAN DIFF?: NO
ANION GAP: 16 (ref 8–16)
BASOPHIL #: 0.1 10^3/UL (ref 0–0.1)
BASOPHILS %: 0.4 % (ref 0–2)
BLOOD UREA NITROGEN: 16 MG/DL (ref 7–20)
CALCIUM: 9 MG/DL (ref 8.4–10.2)
CARBON DIOXIDE: 28 MMOL/L (ref 21–31)
CHLORIDE: 103 MMOL/L (ref 97–110)
CREATININE: 0.83 MG/DL (ref 0.61–1.24)
EOSINOPHILS #: 0.3 10^3/UL (ref 0–0.5)
EOSINOPHILS %: 2.4 % (ref 0–7)
GLUCOSE: 106 MG/DL (ref 70–220)
HEMATOCRIT: 41.5 % (ref 42–52)
HEMOGLOBIN: 13.9 G/DL (ref 14–18)
LYMPHOCYTES #: 1.4 10^3/UL (ref 0.8–2.9)
LYMPHOCYTES %: 11.7 % (ref 15–51)
MAGNESIUM: 2.1 MG/DL (ref 1.7–2.5)
MEAN CORPUSCULAR HEMOGLOBIN: 31 PG (ref 29–33)
MEAN CORPUSCULAR HGB CONC: 33.5 G/DL (ref 32–37)
MEAN CORPUSCULAR VOLUME: 92.4 FL (ref 82–101)
MEAN PLATELET VOLUME: 9.4 FL (ref 7.4–10.4)
MONOCYTE #: 0.7 10^3/UL (ref 0.3–0.9)
MONOCYTES %: 6.2 % (ref 0–11)
NEUTROPHIL #: 9.1 10^3/UL (ref 1.6–7.5)
NEUTROPHILS %: 78.6 % (ref 39–77)
NUCLEATED RED BLOOD CELLS #: 0 10^3/UL (ref 0–0)
NUCLEATED RED BLOOD CELLS%: 0 /100WBC (ref 0–0)
PHOSPHORUS: 3.1 MG/DL (ref 2.5–4.9)
PLATELET COUNT: 294 10^3/UL (ref 140–415)
POTASSIUM: 3.9 MMOL/L (ref 3.5–5.1)
RED BLOOD COUNT: 4.49 10^6/UL (ref 4.7–6.1)
RED CELL DISTRIBUTION WIDTH: 12.8 % (ref 11.5–14.5)
SODIUM: 143 MMOL/L (ref 135–144)
WHITE BLOOD COUNT: 11.5 10^3/UL (ref 4.8–10.8)

## 2018-01-08 RX ADMIN — MEROPENEM 1 MLS/HR: 1 INJECTION, POWDER, FOR SOLUTION INTRAVENOUS at 09:19

## 2018-01-08 RX ADMIN — ERTAPENEM SODIUM 1 MLS/HR: 1 INJECTION, POWDER, LYOPHILIZED, FOR SOLUTION INTRAVENOUS at 14:28

## 2018-01-08 RX ADMIN — ACETAMINOPHEN 1 MG: 325 TABLET, FILM COATED ORAL at 14:29

## 2018-01-08 RX ADMIN — ATORVASTATIN CALCIUM 1 MG: 10 TABLET, FILM COATED ORAL at 09:21

## 2018-01-08 RX ADMIN — METOPROLOL SUCCINATE 1 MG: 100 TABLET, EXTENDED RELEASE ORAL at 09:20

## 2018-01-08 RX ADMIN — VITAMIN D, TAB 1000IU (100/BT) 1 UNIT: 25 TAB at 09:20

## 2018-01-08 RX ADMIN — AMLODIPINE BESYLATE 1 MG: 10 TABLET ORAL at 09:20

## 2018-01-08 RX ADMIN — EZETIMIBE 1 MG: 10 TABLET ORAL at 09:14

## 2018-01-08 RX ADMIN — ASPIRIN 1 MG: 81 TABLET, COATED ORAL at 09:14

## 2018-01-08 RX ADMIN — IPRATROPIUM BROMIDE AND ALBUTEROL SULFATE 1 ML: .5; 3 SOLUTION RESPIRATORY (INHALATION) at 21:20

## 2018-01-08 RX ADMIN — IPRATROPIUM BROMIDE AND ALBUTEROL SULFATE 1 ML: .5; 3 SOLUTION RESPIRATORY (INHALATION) at 09:42

## 2018-01-08 RX ADMIN — PANTOPRAZOLE SODIUM 1 MG: 40 TABLET, DELAYED RELEASE ORAL at 05:53

## 2018-01-08 RX ADMIN — GUAIFENESIN 1 MG: 100 SOLUTION ORAL at 13:11

## 2018-01-09 LAB
ADD MAN DIFF?: NO
BASOPHIL #: 0 10^3/UL (ref 0–0.1)
BASOPHILS %: 0.4 % (ref 0–2)
EOSINOPHILS #: 0.3 10^3/UL (ref 0–0.5)
EOSINOPHILS %: 2.6 % (ref 0–7)
HEMATOCRIT: 40.1 % (ref 42–52)
HEMOGLOBIN: 13.7 G/DL (ref 14–18)
LYMPHOCYTES #: 1.5 10^3/UL (ref 0.8–2.9)
LYMPHOCYTES %: 15.1 % (ref 15–51)
MEAN CORPUSCULAR HEMOGLOBIN: 31.3 PG (ref 29–33)
MEAN CORPUSCULAR HGB CONC: 34.2 G/DL (ref 32–37)
MEAN CORPUSCULAR VOLUME: 91.6 FL (ref 82–101)
MEAN PLATELET VOLUME: 9.4 FL (ref 7.4–10.4)
MONOCYTE #: 0.6 10^3/UL (ref 0.3–0.9)
MONOCYTES %: 5.9 % (ref 0–11)
NEUTROPHIL #: 7.5 10^3/UL (ref 1.6–7.5)
NEUTROPHILS %: 75.5 % (ref 39–77)
NUCLEATED RED BLOOD CELLS #: 0 10^3/UL (ref 0–0)
NUCLEATED RED BLOOD CELLS%: 0 /100WBC (ref 0–0)
PLATELET COUNT: 277 10^3/UL (ref 140–415)
RED BLOOD COUNT: 4.38 10^6/UL (ref 4.7–6.1)
RED CELL DISTRIBUTION WIDTH: 12.9 % (ref 11.5–14.5)
WHITE BLOOD COUNT: 10 10^3/UL (ref 4.8–10.8)

## 2018-01-09 RX ADMIN — EZETIMIBE 1 MG: 10 TABLET ORAL at 08:52

## 2018-01-09 RX ADMIN — ERTAPENEM SODIUM 1 MLS/HR: 1 INJECTION, POWDER, LYOPHILIZED, FOR SOLUTION INTRAVENOUS at 13:43

## 2018-01-09 RX ADMIN — ASPIRIN 1 MG: 81 TABLET, COATED ORAL at 08:53

## 2018-01-09 RX ADMIN — AMLODIPINE BESYLATE 1 MG: 10 TABLET ORAL at 08:53

## 2018-01-09 RX ADMIN — IPRATROPIUM BROMIDE AND ALBUTEROL SULFATE 1 ML: .5; 3 SOLUTION RESPIRATORY (INHALATION) at 08:14

## 2018-01-09 RX ADMIN — VITAMIN D, TAB 1000IU (100/BT) 1 UNIT: 25 TAB at 08:52

## 2018-01-09 RX ADMIN — PANTOPRAZOLE SODIUM 1 MG: 40 TABLET, DELAYED RELEASE ORAL at 06:29

## 2018-01-09 RX ADMIN — ATORVASTATIN CALCIUM 1 MG: 10 TABLET, FILM COATED ORAL at 08:52

## 2018-01-09 RX ADMIN — METOPROLOL SUCCINATE 1 MG: 100 TABLET, EXTENDED RELEASE ORAL at 08:53

## 2018-07-15 ENCOUNTER — HOSPITAL ENCOUNTER (INPATIENT)
Dept: HOSPITAL 12 - ER | Age: 83
LOS: 8 days | Discharge: NURSING, REHAB OR LONG-TERM CARE | DRG: 689 | End: 2018-07-23
Payer: COMMERCIAL

## 2018-07-15 VITALS — HEIGHT: 67 IN | WEIGHT: 183 LBS | BODY MASS INDEX: 28.72 KG/M2

## 2018-07-15 DIAGNOSIS — E78.5: ICD-10-CM

## 2018-07-15 DIAGNOSIS — Z79.899: ICD-10-CM

## 2018-07-15 DIAGNOSIS — I48.0: ICD-10-CM

## 2018-07-15 DIAGNOSIS — Z86.73: ICD-10-CM

## 2018-07-15 DIAGNOSIS — Z79.82: ICD-10-CM

## 2018-07-15 DIAGNOSIS — I11.9: ICD-10-CM

## 2018-07-15 DIAGNOSIS — G92: ICD-10-CM

## 2018-07-15 DIAGNOSIS — K59.00: ICD-10-CM

## 2018-07-15 DIAGNOSIS — I05.0: ICD-10-CM

## 2018-07-15 DIAGNOSIS — R73.9: ICD-10-CM

## 2018-07-15 DIAGNOSIS — M62.81: ICD-10-CM

## 2018-07-15 DIAGNOSIS — B96.89: ICD-10-CM

## 2018-07-15 DIAGNOSIS — F03.91: ICD-10-CM

## 2018-07-15 DIAGNOSIS — E78.00: ICD-10-CM

## 2018-07-15 DIAGNOSIS — N39.0: Primary | ICD-10-CM

## 2018-07-15 DIAGNOSIS — I70.0: ICD-10-CM

## 2018-07-15 LAB
ALP SERPL-CCNC: 67 U/L (ref 50–136)
ALT SERPL W/O P-5'-P-CCNC: 21 U/L (ref 16–63)
APPEARANCE UR: (no result)
AST SERPL-CCNC: 14 U/L (ref 15–37)
BASOPHILS # BLD AUTO: 0.1 K/UL (ref 0–8)
BASOPHILS NFR BLD AUTO: 0.6 % (ref 0–2)
BILIRUB DIRECT SERPL-MCNC: 0.1 MG/DL (ref 0–0.2)
BILIRUB SERPL-MCNC: 0.5 MG/DL (ref 0.2–1)
BILIRUB UR QL STRIP: NEGATIVE
BUN SERPL-MCNC: 23 MG/DL (ref 7–18)
CHLORIDE SERPL-SCNC: 103 MMOL/L (ref 98–107)
CO2 SERPL-SCNC: 27 MMOL/L (ref 21–32)
COLOR UR: YELLOW
CREAT SERPL-MCNC: 1.2 MG/DL (ref 0.6–1.3)
DEPRECATED SQUAMOUS URNS QL MICRO: (no result) /HPF
EOSINOPHIL # BLD AUTO: 0.2 K/UL (ref 0–0.7)
EOSINOPHIL NFR BLD AUTO: 1.4 % (ref 0–7)
GLUCOSE SERPL-MCNC: 146 MG/DL (ref 74–106)
GLUCOSE UR STRIP-MCNC: NEGATIVE MG/DL
HCT VFR BLD AUTO: 45.9 % (ref 36.7–47.1)
HGB BLD-MCNC: 15.4 G/DL (ref 12.5–16.3)
KETONES UR STRIP-MCNC: NEGATIVE MG/DL
LEUKOCYTE ESTERASE UR QL STRIP: (no result)
LYMPHOCYTES # BLD AUTO: 1 K/UL (ref 20–40)
LYMPHOCYTES NFR BLD AUTO: 6.7 % (ref 20.5–51.5)
MCH RBC QN AUTO: 30.5 UUG (ref 23.8–33.4)
MCHC RBC AUTO-ENTMCNC: 34 G/DL (ref 32.5–36.3)
MCV RBC AUTO: 90.7 FL (ref 73–96.2)
MONOCYTES # BLD AUTO: 0.9 K/UL (ref 2–10)
MONOCYTES NFR BLD AUTO: 5.9 % (ref 0–11)
MUCOUS THREADS URNS QL MICRO: (no result) /LPF
NEUTROPHILS # BLD AUTO: 13 K/UL (ref 1.8–8.9)
NEUTROPHILS NFR BLD AUTO: 85.4 % (ref 38.5–71.5)
NITRITE UR QL STRIP: NEGATIVE
PH UR STRIP: 7 [PH] (ref 5–8)
PLATELET # BLD AUTO: 200 K/UL (ref 152–348)
POTASSIUM SERPL-SCNC: 4.1 MMOL/L (ref 3.5–5.1)
PROT UR QL STRIP: (no result)
RBC # BLD AUTO: 5.06 MIL/UL (ref 4.06–5.63)
RBC #/AREA URNS HPF: (no result) /HPF (ref 0–3)
SP GR UR STRIP: 1.02 (ref 1–1.03)
UROBILINOGEN UR STRIP-MCNC: 0.2 E.U./DL
WBC # BLD AUTO: 15.3 K/UL (ref 3.6–10.2)
WBC #/AREA URNS HPF: (no result) /HPF
WBC #/AREA URNS HPF: (no result) /HPF (ref 0–3)
WS STN SPEC: 7.5 G/DL (ref 6.4–8.2)

## 2018-07-15 PROCEDURE — A4663 DIALYSIS BLOOD PRESSURE CUFF: HCPCS

## 2018-07-15 PROCEDURE — C1758 CATHETER, URETERAL: HCPCS

## 2018-07-15 RX ADMIN — SODIUM CHLORIDE PRN MLS/HR: 0.9 INJECTION, SOLUTION INTRAVENOUS at 23:52

## 2018-07-15 RX ADMIN — ACETAMINOPHEN PRN MG: 325 TABLET ORAL at 23:52

## 2018-07-16 VITALS — SYSTOLIC BLOOD PRESSURE: 135 MMHG | DIASTOLIC BLOOD PRESSURE: 69 MMHG

## 2018-07-16 VITALS — SYSTOLIC BLOOD PRESSURE: 141 MMHG | DIASTOLIC BLOOD PRESSURE: 71 MMHG

## 2018-07-16 VITALS — DIASTOLIC BLOOD PRESSURE: 63 MMHG | SYSTOLIC BLOOD PRESSURE: 123 MMHG

## 2018-07-16 VITALS — DIASTOLIC BLOOD PRESSURE: 67 MMHG | SYSTOLIC BLOOD PRESSURE: 118 MMHG

## 2018-07-16 VITALS — DIASTOLIC BLOOD PRESSURE: 74 MMHG | SYSTOLIC BLOOD PRESSURE: 131 MMHG

## 2018-07-16 LAB
BASOPHILS # BLD AUTO: 0 K/UL (ref 0–8)
BASOPHILS NFR BLD AUTO: 0.3 % (ref 0–2)
BUN SERPL-MCNC: 17 MG/DL (ref 7–18)
CHLORIDE SERPL-SCNC: 106 MMOL/L (ref 98–107)
CHOLEST SERPL-MCNC: 174 MG/DL (ref ?–200)
CO2 SERPL-SCNC: 24 MMOL/L (ref 21–32)
CREAT SERPL-MCNC: 1 MG/DL (ref 0.6–1.3)
EOSINOPHIL # BLD AUTO: 0.1 K/UL (ref 0–0.7)
EOSINOPHIL NFR BLD AUTO: 0.8 % (ref 0–7)
GLUCOSE SERPL-MCNC: 129 MG/DL (ref 74–106)
HCT VFR BLD AUTO: 44 % (ref 36.7–47.1)
HDLC SERPL-MCNC: 50 MG/DL (ref 40–60)
HGB BLD-MCNC: 14.7 G/DL (ref 12.5–16.3)
LYMPHOCYTES # BLD AUTO: 0.9 K/UL (ref 20–40)
LYMPHOCYTES NFR BLD AUTO: 6.4 % (ref 20.5–51.5)
MAGNESIUM SERPL-MCNC: 2.3 MG/DL (ref 1.8–2.4)
MCH RBC QN AUTO: 30.4 UUG (ref 23.8–33.4)
MCHC RBC AUTO-ENTMCNC: 34 G/DL (ref 32.5–36.3)
MCV RBC AUTO: 90.9 FL (ref 73–96.2)
MONOCYTES # BLD AUTO: 1.1 K/UL (ref 2–10)
MONOCYTES NFR BLD AUTO: 7.8 % (ref 0–11)
NEUTROPHILS # BLD AUTO: 11.8 K/UL (ref 1.8–8.9)
NEUTROPHILS NFR BLD AUTO: 84.7 % (ref 38.5–71.5)
PHOSPHATE SERPL-MCNC: 3.1 MG/DL (ref 2.5–4.9)
PLATELET # BLD AUTO: 176 K/UL (ref 152–348)
POTASSIUM SERPL-SCNC: 3.9 MMOL/L (ref 3.5–5.1)
RBC # BLD AUTO: 4.84 MIL/UL (ref 4.06–5.63)
TRIGL SERPL-MCNC: 95 MG/DL (ref 30–150)
TSH SERPL DL<=0.005 MIU/L-ACNC: 1.42 MIU/ML (ref 0.36–3.74)
WBC # BLD AUTO: 14 K/UL (ref 3.6–10.2)

## 2018-07-16 RX ADMIN — VITAMIN D, TAB 1000IU (100/BT) SCH UNIT: 25 TAB at 09:20

## 2018-07-16 RX ADMIN — MAGNESIUM HYDROXIDE PRN ML: 400 SUSPENSION ORAL at 20:54

## 2018-07-16 RX ADMIN — ASPIRIN SCH MG: 81 TABLET, CHEWABLE ORAL at 09:19

## 2018-07-16 RX ADMIN — CELECOXIB SCH MG: 100 CAPSULE ORAL at 09:20

## 2018-07-16 RX ADMIN — ENOXAPARIN SODIUM SCH MG: 40 INJECTION SUBCUTANEOUS at 09:25

## 2018-07-16 RX ADMIN — SODIUM CHLORIDE PRN MLS/HR: 0.9 INJECTION, SOLUTION INTRAVENOUS at 13:50

## 2018-07-16 RX ADMIN — SIMVASTATIN SCH MG: 10 TABLET, FILM COATED ORAL at 20:53

## 2018-07-16 RX ADMIN — PANTOPRAZOLE SODIUM SCH MG: 40 TABLET, DELAYED RELEASE ORAL at 06:02

## 2018-07-16 RX ADMIN — DEXTROSE SCH MLS/HR: 50 INJECTION, SOLUTION INTRAVENOUS at 20:53

## 2018-07-16 RX ADMIN — EZETIMIBE SCH MG: 10 TABLET ORAL at 20:53

## 2018-07-16 RX ADMIN — AMLODIPINE BESYLATE SCH MG: 10 TABLET ORAL at 09:20

## 2018-07-16 RX ADMIN — ACETAMINOPHEN PRN MG: 325 TABLET ORAL at 15:53

## 2018-07-17 VITALS — SYSTOLIC BLOOD PRESSURE: 149 MMHG | DIASTOLIC BLOOD PRESSURE: 89 MMHG

## 2018-07-17 VITALS — SYSTOLIC BLOOD PRESSURE: 109 MMHG | DIASTOLIC BLOOD PRESSURE: 61 MMHG

## 2018-07-17 VITALS — DIASTOLIC BLOOD PRESSURE: 78 MMHG | SYSTOLIC BLOOD PRESSURE: 147 MMHG

## 2018-07-17 VITALS — SYSTOLIC BLOOD PRESSURE: 155 MMHG | DIASTOLIC BLOOD PRESSURE: 81 MMHG

## 2018-07-17 VITALS — SYSTOLIC BLOOD PRESSURE: 112 MMHG | DIASTOLIC BLOOD PRESSURE: 60 MMHG

## 2018-07-17 LAB
BASOPHILS # BLD AUTO: 0.2 K/UL (ref 0–8)
BASOPHILS NFR BLD AUTO: 0.9 % (ref 0–2)
BUN SERPL-MCNC: 17 MG/DL (ref 7–18)
CHLORIDE SERPL-SCNC: 106 MMOL/L (ref 98–107)
CO2 SERPL-SCNC: 22 MMOL/L (ref 21–32)
CREAT SERPL-MCNC: 1 MG/DL (ref 0.6–1.3)
EOSINOPHIL # BLD AUTO: 0.1 K/UL (ref 0–0.7)
EOSINOPHIL NFR BLD AUTO: 0.8 % (ref 0–7)
GLUCOSE SERPL-MCNC: 131 MG/DL (ref 74–106)
HCT VFR BLD AUTO: 43.4 % (ref 36.7–47.1)
HGB BLD-MCNC: 14.5 G/DL (ref 12.5–16.3)
LYMPHOCYTES # BLD AUTO: 1.8 K/UL (ref 20–40)
LYMPHOCYTES NFR BLD AUTO: 10.1 % (ref 20.5–51.5)
MCH RBC QN AUTO: 30.6 UUG (ref 23.8–33.4)
MCHC RBC AUTO-ENTMCNC: 34 G/DL (ref 32.5–36.3)
MCV RBC AUTO: 91.1 FL (ref 73–96.2)
MONOCYTES # BLD AUTO: 1.7 K/UL (ref 2–10)
MONOCYTES NFR BLD AUTO: 9.6 % (ref 0–11)
NEUTROPHILS # BLD AUTO: 13.6 K/UL (ref 1.8–8.9)
NEUTROPHILS NFR BLD AUTO: 78.6 % (ref 38.5–71.5)
PLATELET # BLD AUTO: 161 K/UL (ref 152–348)
POTASSIUM SERPL-SCNC: 4 MMOL/L (ref 3.5–5.1)
RBC # BLD AUTO: 4.76 MIL/UL (ref 4.06–5.63)
WBC # BLD AUTO: 17.3 K/UL (ref 3.6–10.2)

## 2018-07-17 RX ADMIN — SIMVASTATIN SCH MG: 10 TABLET, FILM COATED ORAL at 20:51

## 2018-07-17 RX ADMIN — Medication SCH MG: at 08:44

## 2018-07-17 RX ADMIN — AMLODIPINE BESYLATE SCH MG: 10 TABLET ORAL at 08:45

## 2018-07-17 RX ADMIN — DEXTROSE SCH MLS/HR: 50 INJECTION, SOLUTION INTRAVENOUS at 20:51

## 2018-07-17 RX ADMIN — ACETAMINOPHEN PRN MG: 325 TABLET ORAL at 10:34

## 2018-07-17 RX ADMIN — EZETIMIBE SCH MG: 10 TABLET ORAL at 20:51

## 2018-07-17 RX ADMIN — SODIUM CHLORIDE PRN MLS/HR: 0.9 INJECTION, SOLUTION INTRAVENOUS at 11:43

## 2018-07-17 RX ADMIN — ANORECTAL OINTMENT SCH GM: 15.7; .44; 24; 20.6 OINTMENT TOPICAL at 20:51

## 2018-07-17 RX ADMIN — PANTOPRAZOLE SODIUM SCH MG: 40 TABLET, DELAYED RELEASE ORAL at 06:00

## 2018-07-17 RX ADMIN — ENOXAPARIN SODIUM SCH MG: 40 INJECTION SUBCUTANEOUS at 09:21

## 2018-07-17 RX ADMIN — VITAMIN D, TAB 1000IU (100/BT) SCH UNIT: 25 TAB at 08:43

## 2018-07-17 RX ADMIN — ASPIRIN SCH MG: 81 TABLET, CHEWABLE ORAL at 08:42

## 2018-07-17 RX ADMIN — CELECOXIB SCH MG: 100 CAPSULE ORAL at 08:43

## 2018-07-18 VITALS — SYSTOLIC BLOOD PRESSURE: 118 MMHG | DIASTOLIC BLOOD PRESSURE: 71 MMHG

## 2018-07-18 VITALS — SYSTOLIC BLOOD PRESSURE: 110 MMHG | DIASTOLIC BLOOD PRESSURE: 69 MMHG

## 2018-07-18 VITALS — DIASTOLIC BLOOD PRESSURE: 92 MMHG | SYSTOLIC BLOOD PRESSURE: 138 MMHG

## 2018-07-18 VITALS — DIASTOLIC BLOOD PRESSURE: 72 MMHG | SYSTOLIC BLOOD PRESSURE: 118 MMHG

## 2018-07-18 VITALS — DIASTOLIC BLOOD PRESSURE: 62 MMHG | SYSTOLIC BLOOD PRESSURE: 135 MMHG

## 2018-07-18 LAB
BASOPHILS # BLD AUTO: 0 K/UL (ref 0–8)
BASOPHILS NFR BLD AUTO: 0.3 % (ref 0–2)
BUN SERPL-MCNC: 17 MG/DL (ref 7–18)
CHLORIDE SERPL-SCNC: 107 MMOL/L (ref 98–107)
CO2 SERPL-SCNC: 22 MMOL/L (ref 21–32)
CREAT SERPL-MCNC: 1 MG/DL (ref 0.6–1.3)
EOSINOPHIL # BLD AUTO: 0.3 K/UL (ref 0–0.7)
EOSINOPHIL NFR BLD AUTO: 2.4 % (ref 0–7)
GLUCOSE SERPL-MCNC: 176 MG/DL (ref 74–106)
HCT VFR BLD AUTO: 42.4 % (ref 36.7–47.1)
HGB BLD-MCNC: 14.2 G/DL (ref 12.5–16.3)
LYMPHOCYTES # BLD AUTO: 1 K/UL (ref 20–40)
LYMPHOCYTES NFR BLD AUTO: 9.2 % (ref 20.5–51.5)
MCH RBC QN AUTO: 30.9 UUG (ref 23.8–33.4)
MCHC RBC AUTO-ENTMCNC: 34 G/DL (ref 32.5–36.3)
MCV RBC AUTO: 91.9 FL (ref 73–96.2)
MONOCYTES # BLD AUTO: 0.7 K/UL (ref 2–10)
MONOCYTES NFR BLD AUTO: 6.3 % (ref 0–11)
NEUTROPHILS # BLD AUTO: 9.1 K/UL (ref 1.8–8.9)
NEUTROPHILS NFR BLD AUTO: 81.8 % (ref 38.5–71.5)
PLATELET # BLD AUTO: 160 K/UL (ref 152–348)
POTASSIUM SERPL-SCNC: 3.7 MMOL/L (ref 3.5–5.1)
RBC # BLD AUTO: 4.61 MIL/UL (ref 4.06–5.63)
WBC # BLD AUTO: 11.2 K/UL (ref 3.6–10.2)

## 2018-07-18 RX ADMIN — DEXTROSE SCH MLS/HR: 50 INJECTION, SOLUTION INTRAVENOUS at 20:13

## 2018-07-18 RX ADMIN — EZETIMIBE SCH MG: 10 TABLET ORAL at 20:13

## 2018-07-18 RX ADMIN — LORAZEPAM PRN MG: 2 INJECTION INTRAMUSCULAR; INTRAVENOUS at 21:00

## 2018-07-18 RX ADMIN — SIMVASTATIN SCH MG: 10 TABLET, FILM COATED ORAL at 20:13

## 2018-07-18 RX ADMIN — VITAMIN D, TAB 1000IU (100/BT) SCH UNIT: 25 TAB at 09:01

## 2018-07-18 RX ADMIN — Medication SCH MG: at 09:02

## 2018-07-18 RX ADMIN — ANORECTAL OINTMENT SCH APPLIC: 15.7; .44; 24; 20.6 OINTMENT TOPICAL at 09:02

## 2018-07-18 RX ADMIN — AMLODIPINE BESYLATE SCH MG: 10 TABLET ORAL at 09:02

## 2018-07-18 RX ADMIN — ANORECTAL OINTMENT SCH APPLIC: 15.7; .44; 24; 20.6 OINTMENT TOPICAL at 20:13

## 2018-07-18 RX ADMIN — MAGNESIUM HYDROXIDE PRN ML: 400 SUSPENSION ORAL at 09:49

## 2018-07-18 RX ADMIN — ASPIRIN SCH MG: 81 TABLET, CHEWABLE ORAL at 09:01

## 2018-07-18 RX ADMIN — SODIUM CHLORIDE PRN MLS/HR: 0.9 INJECTION, SOLUTION INTRAVENOUS at 03:13

## 2018-07-18 RX ADMIN — PANTOPRAZOLE SODIUM SCH MG: 40 TABLET, DELAYED RELEASE ORAL at 06:06

## 2018-07-18 RX ADMIN — CELECOXIB SCH MG: 100 CAPSULE ORAL at 09:01

## 2018-07-18 RX ADMIN — ACETAMINOPHEN PRN MG: 325 TABLET ORAL at 09:01

## 2018-07-18 RX ADMIN — ENOXAPARIN SODIUM SCH MG: 40 INJECTION SUBCUTANEOUS at 09:04

## 2018-07-19 VITALS — DIASTOLIC BLOOD PRESSURE: 61 MMHG | SYSTOLIC BLOOD PRESSURE: 127 MMHG

## 2018-07-19 VITALS — DIASTOLIC BLOOD PRESSURE: 75 MMHG | SYSTOLIC BLOOD PRESSURE: 143 MMHG

## 2018-07-19 VITALS — SYSTOLIC BLOOD PRESSURE: 115 MMHG | DIASTOLIC BLOOD PRESSURE: 63 MMHG

## 2018-07-19 VITALS — SYSTOLIC BLOOD PRESSURE: 140 MMHG | DIASTOLIC BLOOD PRESSURE: 76 MMHG

## 2018-07-19 VITALS — SYSTOLIC BLOOD PRESSURE: 142 MMHG | DIASTOLIC BLOOD PRESSURE: 71 MMHG

## 2018-07-19 LAB
BASOPHILS # BLD AUTO: 0 K/UL (ref 0–8)
BASOPHILS NFR BLD AUTO: 0.5 % (ref 0–2)
EOSINOPHIL # BLD AUTO: 0.3 K/UL (ref 0–0.7)
EOSINOPHIL NFR BLD AUTO: 3.9 % (ref 0–7)
HCT VFR BLD AUTO: 40 % (ref 36.7–47.1)
HGB BLD-MCNC: 13.7 G/DL (ref 12.5–16.3)
LYMPHOCYTES # BLD AUTO: 1.4 K/UL (ref 20–40)
LYMPHOCYTES NFR BLD AUTO: 16.9 % (ref 20.5–51.5)
MCH RBC QN AUTO: 31.2 UUG (ref 23.8–33.4)
MCHC RBC AUTO-ENTMCNC: 34 G/DL (ref 32.5–36.3)
MCV RBC AUTO: 91.3 FL (ref 73–96.2)
MONOCYTES # BLD AUTO: 0.7 K/UL (ref 2–10)
MONOCYTES NFR BLD AUTO: 8.4 % (ref 0–11)
NEUTROPHILS # BLD AUTO: 5.8 K/UL (ref 1.8–8.9)
NEUTROPHILS NFR BLD AUTO: 70.3 % (ref 38.5–71.5)
PLATELET # BLD AUTO: 180 K/UL (ref 152–348)
RBC # BLD AUTO: 4.39 MIL/UL (ref 4.06–5.63)
WBC # BLD AUTO: 8.3 K/UL (ref 3.6–10.2)

## 2018-07-19 RX ADMIN — SODIUM CHLORIDE PRN MLS/HR: 0.9 INJECTION, SOLUTION INTRAVENOUS at 01:47

## 2018-07-19 RX ADMIN — EZETIMIBE SCH MG: 10 TABLET ORAL at 20:15

## 2018-07-19 RX ADMIN — PANTOPRAZOLE SODIUM SCH MG: 40 TABLET, DELAYED RELEASE ORAL at 06:03

## 2018-07-19 RX ADMIN — Medication SCH MG: at 08:01

## 2018-07-19 RX ADMIN — ANORECTAL OINTMENT SCH APPLIC: 15.7; .44; 24; 20.6 OINTMENT TOPICAL at 08:03

## 2018-07-19 RX ADMIN — ANORECTAL OINTMENT SCH APPLIC: 15.7; .44; 24; 20.6 OINTMENT TOPICAL at 20:15

## 2018-07-19 RX ADMIN — CELECOXIB SCH MG: 100 CAPSULE ORAL at 08:02

## 2018-07-19 RX ADMIN — DEXTROSE SCH MLS/HR: 50 INJECTION, SOLUTION INTRAVENOUS at 20:15

## 2018-07-19 RX ADMIN — AMLODIPINE BESYLATE SCH MG: 10 TABLET ORAL at 08:02

## 2018-07-19 RX ADMIN — SIMVASTATIN SCH MG: 10 TABLET, FILM COATED ORAL at 20:15

## 2018-07-19 RX ADMIN — ASPIRIN SCH MG: 81 TABLET, CHEWABLE ORAL at 08:02

## 2018-07-19 RX ADMIN — LORAZEPAM PRN MG: 2 INJECTION INTRAMUSCULAR; INTRAVENOUS at 16:05

## 2018-07-19 RX ADMIN — VITAMIN D, TAB 1000IU (100/BT) SCH UNIT: 25 TAB at 08:01

## 2018-07-19 RX ADMIN — ENOXAPARIN SODIUM SCH MG: 40 INJECTION SUBCUTANEOUS at 08:03

## 2018-07-19 RX ADMIN — SODIUM CHLORIDE PRN MLS/HR: 0.9 INJECTION, SOLUTION INTRAVENOUS at 16:01

## 2018-07-20 VITALS — SYSTOLIC BLOOD PRESSURE: 116 MMHG | DIASTOLIC BLOOD PRESSURE: 62 MMHG

## 2018-07-20 VITALS — DIASTOLIC BLOOD PRESSURE: 78 MMHG | SYSTOLIC BLOOD PRESSURE: 124 MMHG

## 2018-07-20 VITALS — SYSTOLIC BLOOD PRESSURE: 147 MMHG | DIASTOLIC BLOOD PRESSURE: 72 MMHG

## 2018-07-20 VITALS — DIASTOLIC BLOOD PRESSURE: 81 MMHG | SYSTOLIC BLOOD PRESSURE: 151 MMHG

## 2018-07-20 RX ADMIN — VITAMIN D, TAB 1000IU (100/BT) SCH UNIT: 25 TAB at 09:12

## 2018-07-20 RX ADMIN — DEXTROSE SCH MLS/HR: 50 INJECTION, SOLUTION INTRAVENOUS at 20:23

## 2018-07-20 RX ADMIN — ANORECTAL OINTMENT SCH APPLIC: 15.7; .44; 24; 20.6 OINTMENT TOPICAL at 20:25

## 2018-07-20 RX ADMIN — AMLODIPINE BESYLATE SCH MG: 10 TABLET ORAL at 09:13

## 2018-07-20 RX ADMIN — PANTOPRAZOLE SODIUM SCH MG: 40 TABLET, DELAYED RELEASE ORAL at 06:07

## 2018-07-20 RX ADMIN — ENOXAPARIN SODIUM SCH MG: 40 INJECTION SUBCUTANEOUS at 09:14

## 2018-07-20 RX ADMIN — ANORECTAL OINTMENT SCH APPLIC: 15.7; .44; 24; 20.6 OINTMENT TOPICAL at 09:14

## 2018-07-20 RX ADMIN — CELECOXIB SCH MG: 100 CAPSULE ORAL at 09:12

## 2018-07-20 RX ADMIN — EZETIMIBE SCH MG: 10 TABLET ORAL at 20:25

## 2018-07-20 RX ADMIN — SIMVASTATIN SCH MG: 10 TABLET, FILM COATED ORAL at 20:25

## 2018-07-20 RX ADMIN — SODIUM CHLORIDE PRN MLS/HR: 0.9 INJECTION, SOLUTION INTRAVENOUS at 06:17

## 2018-07-20 RX ADMIN — SODIUM CHLORIDE PRN MLS/HR: 0.9 INJECTION, SOLUTION INTRAVENOUS at 20:22

## 2018-07-20 RX ADMIN — Medication SCH MG: at 09:13

## 2018-07-20 RX ADMIN — ASPIRIN SCH MG: 81 TABLET, CHEWABLE ORAL at 09:13

## 2018-07-21 VITALS — SYSTOLIC BLOOD PRESSURE: 127 MMHG | DIASTOLIC BLOOD PRESSURE: 92 MMHG

## 2018-07-21 VITALS — DIASTOLIC BLOOD PRESSURE: 73 MMHG | SYSTOLIC BLOOD PRESSURE: 130 MMHG

## 2018-07-21 VITALS — DIASTOLIC BLOOD PRESSURE: 86 MMHG | SYSTOLIC BLOOD PRESSURE: 132 MMHG

## 2018-07-21 VITALS — SYSTOLIC BLOOD PRESSURE: 134 MMHG | DIASTOLIC BLOOD PRESSURE: 72 MMHG

## 2018-07-21 RX ADMIN — EZETIMIBE SCH MG: 10 TABLET ORAL at 20:01

## 2018-07-21 RX ADMIN — Medication SCH MG: at 08:27

## 2018-07-21 RX ADMIN — VITAMIN D, TAB 1000IU (100/BT) SCH UNIT: 25 TAB at 08:27

## 2018-07-21 RX ADMIN — PANTOPRAZOLE SODIUM SCH MG: 40 TABLET, DELAYED RELEASE ORAL at 05:59

## 2018-07-21 RX ADMIN — LORAZEPAM PRN MG: 2 INJECTION INTRAMUSCULAR; INTRAVENOUS at 20:02

## 2018-07-21 RX ADMIN — SIMVASTATIN SCH MG: 10 TABLET, FILM COATED ORAL at 20:02

## 2018-07-21 RX ADMIN — ASPIRIN SCH MG: 81 TABLET, CHEWABLE ORAL at 08:26

## 2018-07-21 RX ADMIN — CELECOXIB SCH MG: 100 CAPSULE ORAL at 08:26

## 2018-07-21 RX ADMIN — SODIUM CHLORIDE PRN MLS/HR: 0.9 INJECTION, SOLUTION INTRAVENOUS at 11:14

## 2018-07-21 RX ADMIN — AMLODIPINE BESYLATE SCH MG: 10 TABLET ORAL at 08:28

## 2018-07-21 RX ADMIN — DEXTROSE SCH MLS/HR: 50 INJECTION, SOLUTION INTRAVENOUS at 20:01

## 2018-07-21 RX ADMIN — ANORECTAL OINTMENT SCH APPLIC: 15.7; .44; 24; 20.6 OINTMENT TOPICAL at 20:02

## 2018-07-21 RX ADMIN — ANORECTAL OINTMENT SCH APPLIC: 15.7; .44; 24; 20.6 OINTMENT TOPICAL at 08:28

## 2018-07-21 RX ADMIN — ENOXAPARIN SODIUM SCH MG: 40 INJECTION SUBCUTANEOUS at 08:34

## 2018-07-22 VITALS — SYSTOLIC BLOOD PRESSURE: 139 MMHG | DIASTOLIC BLOOD PRESSURE: 70 MMHG

## 2018-07-22 VITALS — SYSTOLIC BLOOD PRESSURE: 137 MMHG | DIASTOLIC BLOOD PRESSURE: 71 MMHG

## 2018-07-22 VITALS — SYSTOLIC BLOOD PRESSURE: 146 MMHG | DIASTOLIC BLOOD PRESSURE: 76 MMHG

## 2018-07-22 VITALS — DIASTOLIC BLOOD PRESSURE: 73 MMHG | SYSTOLIC BLOOD PRESSURE: 131 MMHG

## 2018-07-22 RX ADMIN — ANORECTAL OINTMENT SCH APPLIC: 15.7; .44; 24; 20.6 OINTMENT TOPICAL at 20:02

## 2018-07-22 RX ADMIN — ENOXAPARIN SODIUM SCH MG: 40 INJECTION SUBCUTANEOUS at 08:26

## 2018-07-22 RX ADMIN — ANORECTAL OINTMENT SCH APPLIC: 15.7; .44; 24; 20.6 OINTMENT TOPICAL at 08:22

## 2018-07-22 RX ADMIN — SIMVASTATIN SCH MG: 10 TABLET, FILM COATED ORAL at 20:01

## 2018-07-22 RX ADMIN — PANTOPRAZOLE SODIUM SCH MG: 40 TABLET, DELAYED RELEASE ORAL at 06:01

## 2018-07-22 RX ADMIN — DEXTROSE SCH MLS/HR: 50 INJECTION, SOLUTION INTRAVENOUS at 20:02

## 2018-07-22 RX ADMIN — ASPIRIN SCH MG: 81 TABLET, CHEWABLE ORAL at 08:22

## 2018-07-22 RX ADMIN — CELECOXIB SCH MG: 100 CAPSULE ORAL at 08:22

## 2018-07-22 RX ADMIN — AMLODIPINE BESYLATE SCH MG: 10 TABLET ORAL at 08:23

## 2018-07-22 RX ADMIN — Medication SCH MG: at 08:23

## 2018-07-22 RX ADMIN — VITAMIN D, TAB 1000IU (100/BT) SCH UNIT: 25 TAB at 08:22

## 2018-07-22 RX ADMIN — SODIUM CHLORIDE PRN MLS/HR: 0.9 INJECTION, SOLUTION INTRAVENOUS at 01:22

## 2018-07-22 RX ADMIN — EZETIMIBE SCH MG: 10 TABLET ORAL at 20:01

## 2018-07-23 VITALS — DIASTOLIC BLOOD PRESSURE: 70 MMHG | SYSTOLIC BLOOD PRESSURE: 132 MMHG

## 2018-07-23 VITALS — SYSTOLIC BLOOD PRESSURE: 118 MMHG | DIASTOLIC BLOOD PRESSURE: 79 MMHG

## 2018-07-23 VITALS — DIASTOLIC BLOOD PRESSURE: 71 MMHG | SYSTOLIC BLOOD PRESSURE: 126 MMHG

## 2018-07-23 RX ADMIN — Medication SCH MG: at 09:22

## 2018-07-23 RX ADMIN — AMLODIPINE BESYLATE SCH MG: 10 TABLET ORAL at 09:22

## 2018-07-23 RX ADMIN — ENOXAPARIN SODIUM SCH MG: 40 INJECTION SUBCUTANEOUS at 09:00

## 2018-07-23 RX ADMIN — CEPHALEXIN SCH MG: 500 CAPSULE ORAL at 12:09

## 2018-07-23 RX ADMIN — ASPIRIN SCH MG: 81 TABLET, CHEWABLE ORAL at 09:20

## 2018-07-23 RX ADMIN — ANORECTAL OINTMENT SCH APPLIC: 15.7; .44; 24; 20.6 OINTMENT TOPICAL at 09:21

## 2018-07-23 RX ADMIN — VITAMIN D, TAB 1000IU (100/BT) SCH UNIT: 25 TAB at 09:21

## 2018-07-23 RX ADMIN — CEPHALEXIN SCH MG: 500 CAPSULE ORAL at 14:13

## 2018-07-23 RX ADMIN — CELECOXIB SCH MG: 100 CAPSULE ORAL at 09:20

## 2018-07-23 RX ADMIN — ACETAMINOPHEN PRN MG: 325 TABLET ORAL at 03:39

## 2018-07-23 RX ADMIN — PANTOPRAZOLE SODIUM SCH MG: 40 TABLET, DELAYED RELEASE ORAL at 07:06

## 2018-09-21 ENCOUNTER — HOSPITAL ENCOUNTER (INPATIENT)
Dept: HOSPITAL 12 - ER | Age: 83
LOS: 4 days | Discharge: HOME | DRG: 871 | End: 2018-09-25
Payer: COMMERCIAL

## 2018-09-21 VITALS — DIASTOLIC BLOOD PRESSURE: 61 MMHG | SYSTOLIC BLOOD PRESSURE: 126 MMHG

## 2018-09-21 VITALS — BODY MASS INDEX: 29.03 KG/M2 | HEIGHT: 67 IN | WEIGHT: 185 LBS

## 2018-09-21 DIAGNOSIS — Z22.322: ICD-10-CM

## 2018-09-21 DIAGNOSIS — I48.0: ICD-10-CM

## 2018-09-21 DIAGNOSIS — Z79.82: ICD-10-CM

## 2018-09-21 DIAGNOSIS — I05.0: ICD-10-CM

## 2018-09-21 DIAGNOSIS — R33.8: ICD-10-CM

## 2018-09-21 DIAGNOSIS — R65.20: ICD-10-CM

## 2018-09-21 DIAGNOSIS — I10: ICD-10-CM

## 2018-09-21 DIAGNOSIS — Z16.11: ICD-10-CM

## 2018-09-21 DIAGNOSIS — E87.1: ICD-10-CM

## 2018-09-21 DIAGNOSIS — Z16.19: ICD-10-CM

## 2018-09-21 DIAGNOSIS — E66.9: ICD-10-CM

## 2018-09-21 DIAGNOSIS — Z82.49: ICD-10-CM

## 2018-09-21 DIAGNOSIS — G93.41: ICD-10-CM

## 2018-09-21 DIAGNOSIS — E86.0: ICD-10-CM

## 2018-09-21 DIAGNOSIS — N39.0: ICD-10-CM

## 2018-09-21 DIAGNOSIS — I69.354: ICD-10-CM

## 2018-09-21 DIAGNOSIS — N13.8: ICD-10-CM

## 2018-09-21 DIAGNOSIS — Z79.899: ICD-10-CM

## 2018-09-21 DIAGNOSIS — N40.1: ICD-10-CM

## 2018-09-21 DIAGNOSIS — E78.5: ICD-10-CM

## 2018-09-21 DIAGNOSIS — F03.90: ICD-10-CM

## 2018-09-21 DIAGNOSIS — Z16.12: ICD-10-CM

## 2018-09-21 DIAGNOSIS — Z87.440: ICD-10-CM

## 2018-09-21 DIAGNOSIS — Z79.84: ICD-10-CM

## 2018-09-21 DIAGNOSIS — N17.9: ICD-10-CM

## 2018-09-21 DIAGNOSIS — A41.59: Primary | ICD-10-CM

## 2018-09-21 DIAGNOSIS — E11.65: ICD-10-CM

## 2018-09-21 DIAGNOSIS — K59.00: ICD-10-CM

## 2018-09-21 DIAGNOSIS — E44.0: ICD-10-CM

## 2018-09-21 LAB
ALP SERPL-CCNC: 68 U/L (ref 50–136)
ALT SERPL W/O P-5'-P-CCNC: 17 U/L (ref 16–63)
APPEARANCE UR: (no result)
AST SERPL-CCNC: 11 U/L (ref 15–37)
BASOPHILS # BLD AUTO: 0.2 K/UL (ref 0–8)
BASOPHILS NFR BLD AUTO: 0.8 % (ref 0–2)
BILIRUB DIRECT SERPL-MCNC: 0.1 MG/DL (ref 0–0.2)
BILIRUB SERPL-MCNC: 0.4 MG/DL (ref 0.2–1)
BILIRUB UR QL STRIP: NEGATIVE
BUN SERPL-MCNC: 23 MG/DL (ref 7–18)
CHLORIDE SERPL-SCNC: 99 MMOL/L (ref 98–107)
CO2 SERPL-SCNC: 22 MMOL/L (ref 21–32)
COLOR UR: YELLOW
CREAT SERPL-MCNC: 1.5 MG/DL (ref 0.6–1.3)
DEPRECATED SQUAMOUS URNS QL MICRO: (no result) /HPF
EOSINOPHIL # BLD AUTO: 0.1 K/UL (ref 0–0.7)
EOSINOPHIL NFR BLD AUTO: 0.5 % (ref 0–7)
GLUCOSE SERPL-MCNC: 167 MG/DL (ref 74–106)
GLUCOSE UR STRIP-MCNC: NEGATIVE MG/DL
HCT VFR BLD AUTO: 39.4 % (ref 36.7–47.1)
HGB BLD-MCNC: 13.5 G/DL (ref 12.5–16.3)
HGB UR QL STRIP: (no result)
KETONES UR STRIP-MCNC: NEGATIVE MG/DL
LEUKOCYTE ESTERASE UR QL STRIP: (no result)
LYMPHOCYTES # BLD AUTO: 2 K/UL (ref 20–40)
LYMPHOCYTES NFR BLD AUTO: 8.7 % (ref 20.5–51.5)
MCH RBC QN AUTO: 30.7 UUG (ref 23.8–33.4)
MCHC RBC AUTO-ENTMCNC: 34 G/DL (ref 32.5–36.3)
MCV RBC AUTO: 89.6 FL (ref 73–96.2)
MONOCYTES # BLD AUTO: 1.5 K/UL (ref 2–10)
MONOCYTES NFR BLD AUTO: 6.5 % (ref 0–11)
NEUTROPHILS # BLD AUTO: 19.4 K/UL (ref 1.8–8.9)
NEUTROPHILS NFR BLD AUTO: 83.5 % (ref 38.5–71.5)
NITRITE UR QL STRIP: POSITIVE
PH UR STRIP: 6 [PH] (ref 5–8)
PLATELET # BLD AUTO: 234 K/UL (ref 152–348)
POTASSIUM SERPL-SCNC: 3.9 MMOL/L (ref 3.5–5.1)
PROT UR QL STRIP: (no result)
RBC # BLD AUTO: 4.4 MIL/UL (ref 4.06–5.63)
SP GR UR STRIP: 1.02 (ref 1–1.03)
UROBILINOGEN UR STRIP-MCNC: 0.2 E.U./DL
WBC # BLD AUTO: 23.2 K/UL (ref 3.6–10.2)
WBC #/AREA URNS HPF: (no result) /HPF
WBC #/AREA URNS HPF: (no result) /HPF (ref 0–3)
WS STN SPEC: 7.7 G/DL (ref 6.4–8.2)

## 2018-09-21 PROCEDURE — 0T9B70Z DRAINAGE OF BLADDER WITH DRAINAGE DEVICE, VIA NATURAL OR ARTIFICIAL OPENING: ICD-10-PCS

## 2018-09-21 PROCEDURE — A4663 DIALYSIS BLOOD PRESSURE CUFF: HCPCS

## 2018-09-21 RX ADMIN — SODIUM CHLORIDE PRN MLS/HR: 0.9 INJECTION, SOLUTION INTRAVENOUS at 21:37

## 2018-09-22 VITALS — SYSTOLIC BLOOD PRESSURE: 136 MMHG | DIASTOLIC BLOOD PRESSURE: 68 MMHG

## 2018-09-22 VITALS — SYSTOLIC BLOOD PRESSURE: 130 MMHG | DIASTOLIC BLOOD PRESSURE: 65 MMHG

## 2018-09-22 VITALS — DIASTOLIC BLOOD PRESSURE: 63 MMHG | SYSTOLIC BLOOD PRESSURE: 129 MMHG

## 2018-09-22 VITALS — DIASTOLIC BLOOD PRESSURE: 60 MMHG | SYSTOLIC BLOOD PRESSURE: 116 MMHG

## 2018-09-22 LAB
APPEARANCE UR: (no result)
BASOPHILS # BLD AUTO: 0.1 K/UL (ref 0–8)
BASOPHILS NFR BLD AUTO: 0.7 % (ref 0–2)
BILIRUB UR QL STRIP: NEGATIVE
BUN SERPL-MCNC: 20 MG/DL (ref 7–18)
CHLORIDE SERPL-SCNC: 106 MMOL/L (ref 98–107)
CHOLEST SERPL-MCNC: 143 MG/DL (ref ?–200)
CO2 SERPL-SCNC: 23 MMOL/L (ref 21–32)
COLOR UR: YELLOW
CREAT SERPL-MCNC: 1.4 MG/DL (ref 0.6–1.3)
CREAT UR-MCNC: 59.9 MG/DL (ref 30–125)
DEPRECATED SQUAMOUS URNS QL MICRO: (no result) /HPF
EOSINOPHIL # BLD AUTO: 0.2 K/UL (ref 0–0.7)
EOSINOPHIL NFR BLD AUTO: 1 % (ref 0–7)
GLUCOSE SERPL-MCNC: 136 MG/DL (ref 74–106)
GLUCOSE UR STRIP-MCNC: NEGATIVE MG/DL
HCT VFR BLD AUTO: 36.1 % (ref 36.7–47.1)
HDLC SERPL-MCNC: 37 MG/DL (ref 40–60)
HGB BLD-MCNC: 12.5 G/DL (ref 12.5–16.3)
HGB UR QL STRIP: (no result)
KETONES UR STRIP-MCNC: NEGATIVE MG/DL
LEUKOCYTE ESTERASE UR QL STRIP: (no result)
LYMPHOCYTES # BLD AUTO: 0.9 K/UL (ref 20–40)
LYMPHOCYTES NFR BLD AUTO: 6.2 % (ref 20.5–51.5)
MAGNESIUM SERPL-MCNC: 1.9 MG/DL (ref 1.8–2.4)
MCH RBC QN AUTO: 31.1 UUG (ref 23.8–33.4)
MCHC RBC AUTO-ENTMCNC: 35 G/DL (ref 32.5–36.3)
MCV RBC AUTO: 89.8 FL (ref 73–96.2)
MONOCYTES # BLD AUTO: 0.9 K/UL (ref 2–10)
MONOCYTES NFR BLD AUTO: 6.2 % (ref 0–11)
MUCOUS THREADS URNS QL MICRO: (no result) /LPF
NEUTROPHILS # BLD AUTO: 12.9 K/UL (ref 1.8–8.9)
NEUTROPHILS NFR BLD AUTO: 85.9 % (ref 38.5–71.5)
NITRITE UR QL STRIP: NEGATIVE
PH UR STRIP: 6 [PH] (ref 5–8)
PHOSPHATE SERPL-MCNC: 2.6 MG/DL (ref 2.5–4.9)
PLATELET # BLD AUTO: 204 K/UL (ref 152–348)
POTASSIUM SERPL-SCNC: 3.9 MMOL/L (ref 3.5–5.1)
PROT UR QL STRIP: (no result)
PROT UR-MCNC: 72.7 MG/DL
RBC # BLD AUTO: 4.01 MIL/UL (ref 4.06–5.63)
SP GR UR STRIP: 1.01 (ref 1–1.03)
TRIGL SERPL-MCNC: 106 MG/DL (ref 30–150)
UROBILINOGEN UR STRIP-MCNC: 0.2 E.U./DL
WBC # BLD AUTO: 15.1 K/UL (ref 3.6–10.2)
WBC #/AREA URNS HPF: (no result) /HPF (ref 0–3)

## 2018-09-22 RX ADMIN — ACETAMINOPHEN PRN MG: 325 TABLET ORAL at 15:44

## 2018-09-22 RX ADMIN — TAZOBACTAM SODIUM AND PIPERACILLIN SODIUM SCH MLS/HR: 250; 2 INJECTION, SOLUTION INTRAVENOUS at 17:01

## 2018-09-22 RX ADMIN — PANTOPRAZOLE SODIUM SCH MG: 40 TABLET, DELAYED RELEASE ORAL at 06:20

## 2018-09-22 RX ADMIN — SODIUM CHLORIDE PRN UNIT: 9 INJECTION, SOLUTION INTRAVENOUS at 12:28

## 2018-09-22 RX ADMIN — SIMVASTATIN SCH MG: 20 TABLET, FILM COATED ORAL at 20:27

## 2018-09-22 RX ADMIN — TAZOBACTAM SODIUM AND PIPERACILLIN SODIUM SCH MLS/HR: 250; 2 INJECTION, SOLUTION INTRAVENOUS at 12:05

## 2018-09-22 RX ADMIN — MAGNESIUM HYDROXIDE PRN ML: 400 SUSPENSION ORAL at 20:27

## 2018-09-22 RX ADMIN — DOCUSATE SODIUM SCH MG: 100 CAPSULE, LIQUID FILLED ORAL at 11:05

## 2018-09-22 RX ADMIN — Medication SCH EACH: at 20:29

## 2018-09-22 RX ADMIN — VITAMIN D, TAB 1000IU (100/BT) SCH UNIT: 25 TAB at 08:32

## 2018-09-22 RX ADMIN — VENLAFAXINE HYDROCHLORIDE SCH MG: 75 CAPSULE, EXTENDED RELEASE ORAL at 08:32

## 2018-09-22 RX ADMIN — AMLODIPINE BESYLATE SCH MG: 10 TABLET ORAL at 08:36

## 2018-09-22 RX ADMIN — TAZOBACTAM SODIUM AND PIPERACILLIN SODIUM SCH MLS/HR: 250; 2 INJECTION, SOLUTION INTRAVENOUS at 23:45

## 2018-09-22 RX ADMIN — MUPIROCIN SCH GM: 20 OINTMENT TOPICAL at 20:27

## 2018-09-22 RX ADMIN — EZETIMIBE SCH MG: 10 TABLET ORAL at 20:27

## 2018-09-22 RX ADMIN — Medication SCH EACH: at 17:02

## 2018-09-22 RX ADMIN — Medication SCH EACH: at 06:22

## 2018-09-22 RX ADMIN — METOPROLOL SUCCINATE SCH MG: 100 TABLET, EXTENDED RELEASE ORAL at 10:05

## 2018-09-22 RX ADMIN — Medication SCH EACH: at 12:02

## 2018-09-22 RX ADMIN — DOCUSATE SODIUM SCH MG: 100 CAPSULE, LIQUID FILLED ORAL at 20:27

## 2018-09-22 RX ADMIN — MAGNESIUM HYDROXIDE PRN ML: 400 SUSPENSION ORAL at 20:32

## 2018-09-22 RX ADMIN — ASPIRIN SCH MG: 81 TABLET, CHEWABLE ORAL at 08:32

## 2018-09-22 RX ADMIN — TAMSULOSIN HYDROCHLORIDE SCH MG: 0.4 CAPSULE ORAL at 20:27

## 2018-09-22 RX ADMIN — SODIUM CHLORIDE PRN MLS/HR: 0.9 INJECTION, SOLUTION INTRAVENOUS at 12:27

## 2018-09-22 RX ADMIN — SODIUM CHLORIDE PRN UNIT: 9 INJECTION, SOLUTION INTRAVENOUS at 17:07

## 2018-09-23 VITALS — SYSTOLIC BLOOD PRESSURE: 137 MMHG | DIASTOLIC BLOOD PRESSURE: 66 MMHG

## 2018-09-23 VITALS — SYSTOLIC BLOOD PRESSURE: 138 MMHG | DIASTOLIC BLOOD PRESSURE: 67 MMHG

## 2018-09-23 VITALS — SYSTOLIC BLOOD PRESSURE: 125 MMHG | DIASTOLIC BLOOD PRESSURE: 55 MMHG

## 2018-09-23 VITALS — DIASTOLIC BLOOD PRESSURE: 62 MMHG | SYSTOLIC BLOOD PRESSURE: 126 MMHG

## 2018-09-23 LAB
ALP SERPL-CCNC: 53 U/L (ref 50–136)
ALT SERPL W/O P-5'-P-CCNC: 16 U/L (ref 16–63)
AST SERPL-CCNC: 15 U/L (ref 15–37)
BASOPHILS # BLD AUTO: 0.1 K/UL (ref 0–8)
BASOPHILS NFR BLD AUTO: 0.6 % (ref 0–2)
BILIRUB SERPL-MCNC: 0.5 MG/DL (ref 0.2–1)
BUN SERPL-MCNC: 19 MG/DL (ref 7–18)
CHLORIDE SERPL-SCNC: 106 MMOL/L (ref 98–107)
CK SERPL-CCNC: 17 U/L (ref 39–308)
CO2 SERPL-SCNC: 23 MMOL/L (ref 21–32)
CREAT SERPL-MCNC: 1.1 MG/DL (ref 0.6–1.3)
EOSINOPHIL # BLD AUTO: 0.4 K/UL (ref 0–0.7)
EOSINOPHIL NFR BLD AUTO: 3.3 % (ref 0–7)
GLUCOSE SERPL-MCNC: 128 MG/DL (ref 74–106)
HCT VFR BLD AUTO: 35.3 % (ref 36.7–47.1)
HGB BLD-MCNC: 12.2 G/DL (ref 12.5–16.3)
LYMPHOCYTES # BLD AUTO: 1.3 K/UL (ref 20–40)
LYMPHOCYTES NFR BLD AUTO: 12.3 % (ref 20.5–51.5)
MAGNESIUM SERPL-MCNC: 1.9 MG/DL (ref 1.8–2.4)
MCH RBC QN AUTO: 31.6 UUG (ref 23.8–33.4)
MCHC RBC AUTO-ENTMCNC: 35 G/DL (ref 32.5–36.3)
MCV RBC AUTO: 91.1 FL (ref 73–96.2)
MONOCYTES # BLD AUTO: 0.9 K/UL (ref 2–10)
MONOCYTES NFR BLD AUTO: 8.2 % (ref 0–11)
NEUTROPHILS # BLD AUTO: 8.2 K/UL (ref 1.8–8.9)
NEUTROPHILS NFR BLD AUTO: 75.6 % (ref 38.5–71.5)
PHOSPHATE SERPL-MCNC: 2.4 MG/DL (ref 2.5–4.9)
PLATELET # BLD AUTO: 199 K/UL (ref 152–348)
POTASSIUM SERPL-SCNC: 3.8 MMOL/L (ref 3.5–5.1)
RBC # BLD AUTO: 3.88 MIL/UL (ref 4.06–5.63)
WBC # BLD AUTO: 10.8 K/UL (ref 3.6–10.2)
WS STN SPEC: 6.7 G/DL (ref 6.4–8.2)

## 2018-09-23 RX ADMIN — PANTOPRAZOLE SODIUM SCH MG: 40 TABLET, DELAYED RELEASE ORAL at 06:12

## 2018-09-23 RX ADMIN — EZETIMIBE SCH MG: 10 TABLET ORAL at 20:09

## 2018-09-23 RX ADMIN — AMLODIPINE BESYLATE SCH MG: 10 TABLET ORAL at 09:47

## 2018-09-23 RX ADMIN — MUPIROCIN SCH APPLIC: 20 OINTMENT TOPICAL at 20:09

## 2018-09-23 RX ADMIN — SODIUM CHLORIDE PRN UNIT: 9 INJECTION, SOLUTION INTRAVENOUS at 17:27

## 2018-09-23 RX ADMIN — Medication SCH EACH: at 06:33

## 2018-09-23 RX ADMIN — ACETAMINOPHEN PRN MG: 325 TABLET ORAL at 20:20

## 2018-09-23 RX ADMIN — Medication SCH EACH: at 12:02

## 2018-09-23 RX ADMIN — DOCUSATE SODIUM SCH MG: 100 CAPSULE, LIQUID FILLED ORAL at 20:09

## 2018-09-23 RX ADMIN — TAMSULOSIN HYDROCHLORIDE SCH MG: 0.4 CAPSULE ORAL at 20:09

## 2018-09-23 RX ADMIN — VITAMIN D, TAB 1000IU (100/BT) SCH UNIT: 25 TAB at 09:41

## 2018-09-23 RX ADMIN — TAZOBACTAM SODIUM AND PIPERACILLIN SODIUM SCH MLS/HR: 250; 2 INJECTION, SOLUTION INTRAVENOUS at 05:18

## 2018-09-23 RX ADMIN — ASPIRIN SCH MG: 81 TABLET, CHEWABLE ORAL at 09:42

## 2018-09-23 RX ADMIN — SODIUM CHLORIDE PRN MLS/HR: 0.9 INJECTION, SOLUTION INTRAVENOUS at 04:35

## 2018-09-23 RX ADMIN — Medication SCH EACH: at 17:22

## 2018-09-23 RX ADMIN — Medication SCH EACH: at 20:19

## 2018-09-23 RX ADMIN — VENLAFAXINE HYDROCHLORIDE SCH MG: 75 CAPSULE, EXTENDED RELEASE ORAL at 09:42

## 2018-09-23 RX ADMIN — METOPROLOL SUCCINATE SCH MG: 100 TABLET, EXTENDED RELEASE ORAL at 09:49

## 2018-09-23 RX ADMIN — SIMVASTATIN SCH MG: 20 TABLET, FILM COATED ORAL at 20:09

## 2018-09-23 RX ADMIN — MUPIROCIN SCH APPLIC: 20 OINTMENT TOPICAL at 09:48

## 2018-09-23 RX ADMIN — LEVOFLOXACIN SCH MG: 250 TABLET, FILM COATED ORAL at 13:58

## 2018-09-23 RX ADMIN — TAZOBACTAM SODIUM AND PIPERACILLIN SODIUM SCH MLS/HR: 250; 2 INJECTION, SOLUTION INTRAVENOUS at 12:03

## 2018-09-23 RX ADMIN — DOCUSATE SODIUM SCH MG: 100 CAPSULE, LIQUID FILLED ORAL at 09:42

## 2018-09-24 VITALS — DIASTOLIC BLOOD PRESSURE: 70 MMHG | SYSTOLIC BLOOD PRESSURE: 139 MMHG

## 2018-09-24 VITALS — SYSTOLIC BLOOD PRESSURE: 140 MMHG | DIASTOLIC BLOOD PRESSURE: 80 MMHG

## 2018-09-24 VITALS — DIASTOLIC BLOOD PRESSURE: 62 MMHG | SYSTOLIC BLOOD PRESSURE: 126 MMHG

## 2018-09-24 VITALS — DIASTOLIC BLOOD PRESSURE: 60 MMHG | SYSTOLIC BLOOD PRESSURE: 105 MMHG

## 2018-09-24 LAB
BUN SERPL-MCNC: 15 MG/DL (ref 7–18)
CHLORIDE SERPL-SCNC: 108 MMOL/L (ref 98–107)
CO2 SERPL-SCNC: 24 MMOL/L (ref 21–32)
CREAT SERPL-MCNC: 1 MG/DL (ref 0.6–1.3)
GLUCOSE SERPL-MCNC: 116 MG/DL (ref 74–106)
PHOSPHATE SERPL-MCNC: 2.8 MG/DL (ref 2.5–4.9)
POTASSIUM SERPL-SCNC: 3.7 MMOL/L (ref 3.5–5.1)

## 2018-09-24 RX ADMIN — SODIUM CHLORIDE PRN UNIT: 9 INJECTION, SOLUTION INTRAVENOUS at 18:02

## 2018-09-24 RX ADMIN — MUPIROCIN SCH APPLIC: 20 OINTMENT TOPICAL at 20:37

## 2018-09-24 RX ADMIN — VENLAFAXINE HYDROCHLORIDE SCH MG: 75 CAPSULE, EXTENDED RELEASE ORAL at 08:24

## 2018-09-24 RX ADMIN — SODIUM CHLORIDE PRN UNIT: 9 INJECTION, SOLUTION INTRAVENOUS at 12:47

## 2018-09-24 RX ADMIN — ASPIRIN SCH MG: 81 TABLET, CHEWABLE ORAL at 08:24

## 2018-09-24 RX ADMIN — DOCUSATE SODIUM SCH MG: 100 CAPSULE, LIQUID FILLED ORAL at 20:37

## 2018-09-24 RX ADMIN — VITAMIN D, TAB 1000IU (100/BT) SCH UNIT: 25 TAB at 08:24

## 2018-09-24 RX ADMIN — METOPROLOL SUCCINATE SCH MG: 100 TABLET, EXTENDED RELEASE ORAL at 08:24

## 2018-09-24 RX ADMIN — EZETIMIBE SCH MG: 10 TABLET ORAL at 20:38

## 2018-09-24 RX ADMIN — MUPIROCIN SCH APPLIC: 20 OINTMENT TOPICAL at 08:22

## 2018-09-24 RX ADMIN — LEVOFLOXACIN SCH MG: 250 TABLET, FILM COATED ORAL at 12:44

## 2018-09-24 RX ADMIN — ACETAMINOPHEN PRN MG: 325 TABLET ORAL at 20:38

## 2018-09-24 RX ADMIN — TAMSULOSIN HYDROCHLORIDE SCH MG: 0.4 CAPSULE ORAL at 20:37

## 2018-09-24 RX ADMIN — Medication SCH EACH: at 11:34

## 2018-09-24 RX ADMIN — Medication SCH EACH: at 17:01

## 2018-09-24 RX ADMIN — SIMVASTATIN SCH MG: 20 TABLET, FILM COATED ORAL at 20:38

## 2018-09-24 RX ADMIN — PANTOPRAZOLE SODIUM SCH MG: 40 TABLET, DELAYED RELEASE ORAL at 06:12

## 2018-09-24 RX ADMIN — DOCUSATE SODIUM SCH MG: 100 CAPSULE, LIQUID FILLED ORAL at 08:24

## 2018-09-24 RX ADMIN — AMLODIPINE BESYLATE SCH MG: 10 TABLET ORAL at 08:24

## 2018-09-24 RX ADMIN — SODIUM CHLORIDE PRN MLS/HR: 0.9 INJECTION, SOLUTION INTRAVENOUS at 00:55

## 2018-09-24 RX ADMIN — Medication SCH EACH: at 20:51

## 2018-09-24 RX ADMIN — Medication SCH EACH: at 06:37

## 2018-09-25 VITALS — DIASTOLIC BLOOD PRESSURE: 65 MMHG | SYSTOLIC BLOOD PRESSURE: 131 MMHG

## 2018-09-25 VITALS — DIASTOLIC BLOOD PRESSURE: 71 MMHG | SYSTOLIC BLOOD PRESSURE: 125 MMHG

## 2018-09-25 LAB
ALBUMIN SERPL ELPH-MCNC: 2.6 G/DL (ref 2.9–4.4)
ALBUMIN/GLOB SERPL: 0.8 {RATIO} (ref 0.7–1.7)
ALPHA1 GLOB SERPL ELPH-MCNC: 0.5 G/DL (ref 0–0.4)
ALPHA2 GLOB SERPL ELPH-MCNC: 1 G/DL (ref 0.4–1)
B-GLOBULIN SERPL ELPH-MCNC: 1 G/DL (ref 0.7–1.3)
GAMMA GLOB SERPL ELPH-MCNC: 0.8 G/DL (ref 0.4–1.8)
GLOBULIN SER CALC-MCNC: 3.3 G/DL (ref 2.2–3.9)

## 2018-09-25 RX ADMIN — METOPROLOL SUCCINATE SCH MG: 100 TABLET, EXTENDED RELEASE ORAL at 08:38

## 2018-09-25 RX ADMIN — ASPIRIN SCH MG: 81 TABLET, CHEWABLE ORAL at 08:38

## 2018-09-25 RX ADMIN — VITAMIN D, TAB 1000IU (100/BT) SCH UNIT: 25 TAB at 08:37

## 2018-09-25 RX ADMIN — PANTOPRAZOLE SODIUM SCH MG: 40 TABLET, DELAYED RELEASE ORAL at 06:23

## 2018-09-25 RX ADMIN — DOCUSATE SODIUM SCH MG: 100 CAPSULE, LIQUID FILLED ORAL at 08:38

## 2018-09-25 RX ADMIN — AMLODIPINE BESYLATE SCH MG: 10 TABLET ORAL at 08:38

## 2018-09-25 RX ADMIN — Medication SCH EACH: at 06:46

## 2018-09-25 RX ADMIN — VENLAFAXINE HYDROCHLORIDE SCH MG: 75 CAPSULE, EXTENDED RELEASE ORAL at 08:38

## 2018-09-25 RX ADMIN — MUPIROCIN SCH APPLIC: 20 OINTMENT TOPICAL at 08:39

## 2019-05-06 ENCOUNTER — HOSPITAL ENCOUNTER (INPATIENT)
Dept: HOSPITAL 12 - ER | Age: 84
LOS: 7 days | Discharge: NURSING, REHAB OR LONG-TERM CARE | DRG: 871 | End: 2019-05-13
Attending: INTERNAL MEDICINE | Admitting: INTERNAL MEDICINE
Payer: MEDICARE

## 2019-05-06 VITALS — BODY MASS INDEX: 27.78 KG/M2 | WEIGHT: 177 LBS | HEIGHT: 67 IN

## 2019-05-06 VITALS — DIASTOLIC BLOOD PRESSURE: 68 MMHG | SYSTOLIC BLOOD PRESSURE: 113 MMHG

## 2019-05-06 DIAGNOSIS — N39.0: ICD-10-CM

## 2019-05-06 DIAGNOSIS — N40.0: ICD-10-CM

## 2019-05-06 DIAGNOSIS — Z87.891: ICD-10-CM

## 2019-05-06 DIAGNOSIS — Z82.49: ICD-10-CM

## 2019-05-06 DIAGNOSIS — M19.90: ICD-10-CM

## 2019-05-06 DIAGNOSIS — F01.50: ICD-10-CM

## 2019-05-06 DIAGNOSIS — I10: ICD-10-CM

## 2019-05-06 DIAGNOSIS — D68.59: ICD-10-CM

## 2019-05-06 DIAGNOSIS — I70.0: ICD-10-CM

## 2019-05-06 DIAGNOSIS — M85.80: ICD-10-CM

## 2019-05-06 DIAGNOSIS — Z86.14: ICD-10-CM

## 2019-05-06 DIAGNOSIS — Z98.42: ICD-10-CM

## 2019-05-06 DIAGNOSIS — Z79.84: ICD-10-CM

## 2019-05-06 DIAGNOSIS — Z90.79: ICD-10-CM

## 2019-05-06 DIAGNOSIS — I69.354: ICD-10-CM

## 2019-05-06 DIAGNOSIS — Z87.440: ICD-10-CM

## 2019-05-06 DIAGNOSIS — B95.2: ICD-10-CM

## 2019-05-06 DIAGNOSIS — E11.65: ICD-10-CM

## 2019-05-06 DIAGNOSIS — Z79.82: ICD-10-CM

## 2019-05-06 DIAGNOSIS — I48.2: ICD-10-CM

## 2019-05-06 DIAGNOSIS — I48.0: ICD-10-CM

## 2019-05-06 DIAGNOSIS — I05.0: ICD-10-CM

## 2019-05-06 DIAGNOSIS — Z74.09: ICD-10-CM

## 2019-05-06 DIAGNOSIS — A41.9: Primary | ICD-10-CM

## 2019-05-06 DIAGNOSIS — Z75.1: ICD-10-CM

## 2019-05-06 DIAGNOSIS — E66.3: ICD-10-CM

## 2019-05-06 DIAGNOSIS — E78.5: ICD-10-CM

## 2019-05-06 DIAGNOSIS — G92: ICD-10-CM

## 2019-05-06 DIAGNOSIS — Z98.41: ICD-10-CM

## 2019-05-06 DIAGNOSIS — Z79.899: ICD-10-CM

## 2019-05-06 LAB
ALP SERPL-CCNC: 57 U/L (ref 50–136)
ALT SERPL W/O P-5'-P-CCNC: 13 U/L (ref 16–63)
APPEARANCE UR: CLEAR
AST SERPL-CCNC: 10 U/L (ref 15–37)
BASOPHILS # BLD AUTO: 0.2 K/UL (ref 0–8)
BASOPHILS NFR BLD AUTO: 1.3 % (ref 0–2)
BILIRUB DIRECT SERPL-MCNC: 0.1 MG/DL (ref 0–0.2)
BILIRUB SERPL-MCNC: 0.7 MG/DL (ref 0.2–1)
BILIRUB UR QL STRIP: NEGATIVE
BUN SERPL-MCNC: 16 MG/DL (ref 7–18)
CHLORIDE SERPL-SCNC: 103 MMOL/L (ref 98–107)
CO2 SERPL-SCNC: 23 MMOL/L (ref 21–32)
COLOR UR: YELLOW
CREAT SERPL-MCNC: 1.2 MG/DL (ref 0.6–1.3)
EOSINOPHIL # BLD AUTO: 0 K/UL (ref 0–0.7)
EOSINOPHIL NFR BLD AUTO: 0.2 % (ref 0–7)
GLUCOSE SERPL-MCNC: 126 MG/DL (ref 74–106)
GLUCOSE UR STRIP-MCNC: NEGATIVE MG/DL
HCT VFR BLD AUTO: 43.5 % (ref 36.7–47.1)
HGB BLD-MCNC: 14.7 G/DL (ref 12.5–16.3)
HGB UR QL STRIP: (no result)
KETONES UR STRIP-MCNC: (no result) MG/DL
LEUKOCYTE ESTERASE UR QL STRIP: (no result)
LIPASE SERPL-CCNC: 73 U/L (ref 73–393)
LYMPHOCYTES # BLD AUTO: 1 K/UL (ref 20–40)
LYMPHOCYTES NFR BLD AUTO: 7.4 % (ref 20.5–51.5)
MCH RBC QN AUTO: 30.4 UUG (ref 23.8–33.4)
MCHC RBC AUTO-ENTMCNC: 34 G/DL (ref 32.5–36.3)
MCV RBC AUTO: 90 FL (ref 73–96.2)
MONOCYTES # BLD AUTO: 1 K/UL (ref 2–10)
MONOCYTES NFR BLD AUTO: 7.2 % (ref 0–11)
NEUTROPHILS # BLD AUTO: 11.8 K/UL (ref 1.8–8.9)
NEUTROPHILS NFR BLD AUTO: 83.9 % (ref 38.5–71.5)
NITRITE UR QL STRIP: NEGATIVE
PH UR STRIP: 6.5 [PH] (ref 5–8)
PLATELET # BLD AUTO: 191 K/UL (ref 152–348)
POTASSIUM SERPL-SCNC: 3.9 MMOL/L (ref 3.5–5.1)
RBC # BLD AUTO: 4.84 MIL/UL (ref 4.06–5.63)
RBC #/AREA URNS HPF: (no result) /HPF (ref 0–3)
SP GR UR STRIP: 1.01 (ref 1–1.03)
UROBILINOGEN UR STRIP-MCNC: 0.2 E.U./DL
WBC # BLD AUTO: 14 K/UL (ref 3.6–10.2)
WBC #/AREA URNS HPF: (no result) /HPF
WBC #/AREA URNS HPF: (no result) /HPF (ref 0–3)
WS STN SPEC: 7.9 G/DL (ref 6.4–8.2)

## 2019-05-06 PROCEDURE — G0378 HOSPITAL OBSERVATION PER HR: HCPCS

## 2019-05-06 PROCEDURE — A4663 DIALYSIS BLOOD PRESSURE CUFF: HCPCS

## 2019-05-06 RX ADMIN — SODIUM CHLORIDE PRN MLS/HR: 0.45 INJECTION, SOLUTION INTRAVENOUS at 23:03

## 2019-05-06 RX ADMIN — ACETAMINOPHEN PRN MG: 325 TABLET ORAL at 23:48

## 2019-05-06 RX ADMIN — TAMSULOSIN HYDROCHLORIDE SCH MG: 0.4 CAPSULE ORAL at 23:03

## 2019-05-07 VITALS — SYSTOLIC BLOOD PRESSURE: 125 MMHG | DIASTOLIC BLOOD PRESSURE: 70 MMHG

## 2019-05-07 VITALS — SYSTOLIC BLOOD PRESSURE: 113 MMHG | DIASTOLIC BLOOD PRESSURE: 79 MMHG

## 2019-05-07 VITALS — DIASTOLIC BLOOD PRESSURE: 70 MMHG | SYSTOLIC BLOOD PRESSURE: 133 MMHG

## 2019-05-07 VITALS — DIASTOLIC BLOOD PRESSURE: 60 MMHG | SYSTOLIC BLOOD PRESSURE: 118 MMHG

## 2019-05-07 VITALS — DIASTOLIC BLOOD PRESSURE: 56 MMHG | SYSTOLIC BLOOD PRESSURE: 114 MMHG

## 2019-05-07 VITALS — DIASTOLIC BLOOD PRESSURE: 57 MMHG | SYSTOLIC BLOOD PRESSURE: 126 MMHG

## 2019-05-07 LAB
ALP SERPL-CCNC: 48 U/L (ref 50–136)
ALT SERPL W/O P-5'-P-CCNC: 13 U/L (ref 16–63)
AST SERPL-CCNC: 15 U/L (ref 15–37)
BASOPHILS # BLD AUTO: 0.1 K/UL (ref 0–8)
BASOPHILS NFR BLD AUTO: 0.5 % (ref 0–2)
BILIRUB SERPL-MCNC: 0.5 MG/DL (ref 0.2–1)
BUN SERPL-MCNC: 16 MG/DL (ref 7–18)
CHLORIDE SERPL-SCNC: 106 MMOL/L (ref 98–107)
CHOLEST SERPL-MCNC: 151 MG/DL (ref ?–200)
CO2 SERPL-SCNC: 20 MMOL/L (ref 21–32)
CREAT SERPL-MCNC: 1.2 MG/DL (ref 0.6–1.3)
EOSINOPHIL # BLD AUTO: 0 K/UL (ref 0–0.7)
EOSINOPHIL NFR BLD AUTO: 0.1 % (ref 0–7)
GLUCOSE SERPL-MCNC: 131 MG/DL (ref 74–106)
HCT VFR BLD AUTO: 41.7 % (ref 36.7–47.1)
HDLC SERPL-MCNC: 40 MG/DL (ref 40–60)
HGB BLD-MCNC: 14.2 G/DL (ref 12.5–16.3)
LYMPHOCYTES # BLD AUTO: 0.6 K/UL (ref 20–40)
LYMPHOCYTES NFR BLD AUTO: 4.2 % (ref 20.5–51.5)
MAGNESIUM SERPL-MCNC: 1.8 MG/DL (ref 1.8–2.4)
MCH RBC QN AUTO: 30.3 UUG (ref 23.8–33.4)
MCHC RBC AUTO-ENTMCNC: 34 G/DL (ref 32.5–36.3)
MCV RBC AUTO: 89.1 FL (ref 73–96.2)
MONOCYTES # BLD AUTO: 1.1 K/UL (ref 2–10)
MONOCYTES NFR BLD AUTO: 7.2 % (ref 0–11)
NEUTROPHILS # BLD AUTO: 13.3 K/UL (ref 1.8–8.9)
NEUTROPHILS NFR BLD AUTO: 88 % (ref 38.5–71.5)
PHOSPHATE SERPL-MCNC: 2.2 MG/DL (ref 2.5–4.9)
PLATELET # BLD AUTO: 171 K/UL (ref 152–348)
POTASSIUM SERPL-SCNC: 3.9 MMOL/L (ref 3.5–5.1)
RBC # BLD AUTO: 4.67 MIL/UL (ref 4.06–5.63)
TRIGL SERPL-MCNC: 119 MG/DL (ref 30–150)
WBC # BLD AUTO: 15.1 K/UL (ref 3.6–10.2)
WS STN SPEC: 6.9 G/DL (ref 6.4–8.2)

## 2019-05-07 RX ADMIN — ANORECTAL OINTMENT SCH APPLIC: 15.7; .44; 24; 20.6 OINTMENT TOPICAL at 20:28

## 2019-05-07 RX ADMIN — CELECOXIB SCH MG: 100 CAPSULE ORAL at 16:59

## 2019-05-07 RX ADMIN — SODIUM CHLORIDE SCH MLS/HR: 9 INJECTION, SOLUTION INTRAVENOUS at 23:02

## 2019-05-07 RX ADMIN — SODIUM CHLORIDE SCH MLS/HR: 9 INJECTION, SOLUTION INTRAVENOUS at 11:07

## 2019-05-07 RX ADMIN — PANTOPRAZOLE SODIUM SCH MG: 40 TABLET, DELAYED RELEASE ORAL at 06:04

## 2019-05-07 RX ADMIN — SODIUM CHLORIDE PRN MLS/HR: 0.45 INJECTION, SOLUTION INTRAVENOUS at 17:19

## 2019-05-07 RX ADMIN — Medication SCH EACH: at 09:10

## 2019-05-07 RX ADMIN — VITAMIN D, TAB 1000IU (100/BT) SCH UNIT: 25 TAB at 09:10

## 2019-05-07 RX ADMIN — METOPROLOL SUCCINATE SCH MG: 50 TABLET, FILM COATED, EXTENDED RELEASE ORAL at 09:15

## 2019-05-07 RX ADMIN — ACETAMINOPHEN PRN MG: 325 TABLET ORAL at 13:32

## 2019-05-07 RX ADMIN — TAMSULOSIN HYDROCHLORIDE SCH MG: 0.4 CAPSULE ORAL at 20:23

## 2019-05-07 RX ADMIN — CELECOXIB SCH MG: 100 CAPSULE ORAL at 09:10

## 2019-05-07 RX ADMIN — ASPIRIN SCH MG: 81 TABLET, CHEWABLE ORAL at 09:10

## 2019-05-07 RX ADMIN — ACETAMINOPHEN PRN MG: 325 TABLET ORAL at 23:44

## 2019-05-07 RX ADMIN — DOCUSATE SODIUM SCH MG: 100 CAPSULE, LIQUID FILLED ORAL at 20:23

## 2019-05-08 VITALS — DIASTOLIC BLOOD PRESSURE: 60 MMHG | SYSTOLIC BLOOD PRESSURE: 134 MMHG

## 2019-05-08 VITALS — SYSTOLIC BLOOD PRESSURE: 136 MMHG | DIASTOLIC BLOOD PRESSURE: 67 MMHG

## 2019-05-08 VITALS — SYSTOLIC BLOOD PRESSURE: 120 MMHG | DIASTOLIC BLOOD PRESSURE: 64 MMHG

## 2019-05-08 VITALS — SYSTOLIC BLOOD PRESSURE: 117 MMHG | DIASTOLIC BLOOD PRESSURE: 63 MMHG

## 2019-05-08 VITALS — SYSTOLIC BLOOD PRESSURE: 124 MMHG | DIASTOLIC BLOOD PRESSURE: 82 MMHG

## 2019-05-08 VITALS — DIASTOLIC BLOOD PRESSURE: 53 MMHG | SYSTOLIC BLOOD PRESSURE: 107 MMHG

## 2019-05-08 LAB
BASOPHILS # BLD AUTO: 0.1 K/UL (ref 0–8)
BASOPHILS NFR BLD AUTO: 0.4 % (ref 0–2)
BUN SERPL-MCNC: 18 MG/DL (ref 7–18)
CHLORIDE SERPL-SCNC: 107 MMOL/L (ref 98–107)
CO2 SERPL-SCNC: 23 MMOL/L (ref 21–32)
CREAT SERPL-MCNC: 1 MG/DL (ref 0.6–1.3)
EOSINOPHIL # BLD AUTO: 0.2 K/UL (ref 0–0.7)
EOSINOPHIL NFR BLD AUTO: 1.1 % (ref 0–7)
GLUCOSE SERPL-MCNC: 113 MG/DL (ref 74–106)
HCT VFR BLD AUTO: 39.5 % (ref 36.7–47.1)
HGB BLD-MCNC: 13.1 G/DL (ref 12.5–16.3)
LYMPHOCYTES # BLD AUTO: 1.2 K/UL (ref 20–40)
LYMPHOCYTES NFR BLD AUTO: 7.9 % (ref 20.5–51.5)
MCH RBC QN AUTO: 29.9 UUG (ref 23.8–33.4)
MCHC RBC AUTO-ENTMCNC: 33 G/DL (ref 32.5–36.3)
MCV RBC AUTO: 90.1 FL (ref 73–96.2)
MONOCYTES # BLD AUTO: 1.3 K/UL (ref 2–10)
MONOCYTES NFR BLD AUTO: 8.5 % (ref 0–11)
NEUTROPHILS # BLD AUTO: 12.5 K/UL (ref 1.8–8.9)
NEUTROPHILS NFR BLD AUTO: 82.1 % (ref 38.5–71.5)
PLATELET # BLD AUTO: 155 K/UL (ref 152–348)
POTASSIUM SERPL-SCNC: 3.7 MMOL/L (ref 3.5–5.1)
RBC # BLD AUTO: 4.38 MIL/UL (ref 4.06–5.63)
WBC # BLD AUTO: 15.3 K/UL (ref 3.6–10.2)

## 2019-05-08 RX ADMIN — TAMSULOSIN HYDROCHLORIDE SCH MG: 0.4 CAPSULE ORAL at 20:48

## 2019-05-08 RX ADMIN — CELECOXIB SCH MG: 100 CAPSULE ORAL at 16:25

## 2019-05-08 RX ADMIN — ANORECTAL OINTMENT SCH APPLIC: 15.7; .44; 24; 20.6 OINTMENT TOPICAL at 20:49

## 2019-05-08 RX ADMIN — ACETAMINOPHEN PRN MG: 325 TABLET ORAL at 20:48

## 2019-05-08 RX ADMIN — METOPROLOL SUCCINATE SCH MG: 50 TABLET, FILM COATED, EXTENDED RELEASE ORAL at 08:54

## 2019-05-08 RX ADMIN — Medication SCH EACH: at 08:52

## 2019-05-08 RX ADMIN — ASPIRIN SCH MG: 81 TABLET, CHEWABLE ORAL at 08:50

## 2019-05-08 RX ADMIN — PANTOPRAZOLE SODIUM SCH MG: 40 TABLET, DELAYED RELEASE ORAL at 06:17

## 2019-05-08 RX ADMIN — CELECOXIB SCH MG: 100 CAPSULE ORAL at 08:51

## 2019-05-08 RX ADMIN — SODIUM CHLORIDE SCH MLS/HR: 9 INJECTION, SOLUTION INTRAVENOUS at 11:54

## 2019-05-08 RX ADMIN — ACETAMINOPHEN PRN MG: 325 TABLET ORAL at 08:51

## 2019-05-08 RX ADMIN — POLYETHYLENE GLYCOL 3350 SCH GM: 17 POWDER, FOR SOLUTION ORAL at 11:55

## 2019-05-08 RX ADMIN — VITAMIN D, TAB 1000IU (100/BT) SCH UNIT: 25 TAB at 08:54

## 2019-05-08 RX ADMIN — DOCUSATE SODIUM SCH MG: 100 CAPSULE, LIQUID FILLED ORAL at 20:49

## 2019-05-08 RX ADMIN — SODIUM CHLORIDE SCH MLS/HR: 9 INJECTION, SOLUTION INTRAVENOUS at 23:01

## 2019-05-08 RX ADMIN — ANORECTAL OINTMENT SCH APPLIC: 15.7; .44; 24; 20.6 OINTMENT TOPICAL at 11:54

## 2019-05-08 RX ADMIN — SODIUM CHLORIDE PRN MLS/HR: 0.45 INJECTION, SOLUTION INTRAVENOUS at 19:00

## 2019-05-09 VITALS — SYSTOLIC BLOOD PRESSURE: 128 MMHG | DIASTOLIC BLOOD PRESSURE: 69 MMHG

## 2019-05-09 VITALS — SYSTOLIC BLOOD PRESSURE: 168 MMHG | DIASTOLIC BLOOD PRESSURE: 54 MMHG

## 2019-05-09 VITALS — DIASTOLIC BLOOD PRESSURE: 59 MMHG | SYSTOLIC BLOOD PRESSURE: 110 MMHG

## 2019-05-09 VITALS — DIASTOLIC BLOOD PRESSURE: 63 MMHG | SYSTOLIC BLOOD PRESSURE: 116 MMHG

## 2019-05-09 VITALS — DIASTOLIC BLOOD PRESSURE: 53 MMHG | SYSTOLIC BLOOD PRESSURE: 117 MMHG

## 2019-05-09 LAB
BASOPHILS # BLD AUTO: 0 K/UL (ref 0–8)
BASOPHILS NFR BLD AUTO: 0.2 % (ref 0–2)
BUN SERPL-MCNC: 17 MG/DL (ref 7–18)
CHLORIDE SERPL-SCNC: 108 MMOL/L (ref 98–107)
CO2 SERPL-SCNC: 23 MMOL/L (ref 21–32)
CREAT SERPL-MCNC: 0.9 MG/DL (ref 0.6–1.3)
EOSINOPHIL # BLD AUTO: 0.3 K/UL (ref 0–0.7)
EOSINOPHIL NFR BLD AUTO: 2.8 % (ref 0–7)
GLUCOSE SERPL-MCNC: 104 MG/DL (ref 74–106)
HCT VFR BLD AUTO: 36.2 % (ref 36.7–47.1)
HGB BLD-MCNC: 12.5 G/DL (ref 12.5–16.3)
LYMPHOCYTES # BLD AUTO: 1.3 K/UL (ref 20–40)
LYMPHOCYTES NFR BLD AUTO: 12.7 % (ref 20.5–51.5)
MCH RBC QN AUTO: 30.6 UUG (ref 23.8–33.4)
MCHC RBC AUTO-ENTMCNC: 35 G/DL (ref 32.5–36.3)
MCV RBC AUTO: 88.6 FL (ref 73–96.2)
MONOCYTES # BLD AUTO: 0.7 K/UL (ref 2–10)
MONOCYTES NFR BLD AUTO: 7.2 % (ref 0–11)
NEUTROPHILS # BLD AUTO: 7.7 K/UL (ref 1.8–8.9)
NEUTROPHILS NFR BLD AUTO: 77.1 % (ref 38.5–71.5)
PLATELET # BLD AUTO: 170 K/UL (ref 152–348)
POTASSIUM SERPL-SCNC: 3.7 MMOL/L (ref 3.5–5.1)
RBC # BLD AUTO: 4.09 MIL/UL (ref 4.06–5.63)
WBC # BLD AUTO: 9.9 K/UL (ref 3.6–10.2)

## 2019-05-09 RX ADMIN — CELECOXIB SCH MG: 100 CAPSULE ORAL at 17:15

## 2019-05-09 RX ADMIN — Medication SCH EACH: at 09:01

## 2019-05-09 RX ADMIN — TAMSULOSIN HYDROCHLORIDE SCH MG: 0.4 CAPSULE ORAL at 21:21

## 2019-05-09 RX ADMIN — PANTOPRAZOLE SODIUM SCH MG: 40 TABLET, DELAYED RELEASE ORAL at 06:05

## 2019-05-09 RX ADMIN — VITAMIN D, TAB 1000IU (100/BT) SCH UNIT: 25 TAB at 09:01

## 2019-05-09 RX ADMIN — ANORECTAL OINTMENT SCH APPLIC: 15.7; .44; 24; 20.6 OINTMENT TOPICAL at 21:31

## 2019-05-09 RX ADMIN — METOPROLOL SUCCINATE SCH MG: 50 TABLET, FILM COATED, EXTENDED RELEASE ORAL at 09:01

## 2019-05-09 RX ADMIN — CELECOXIB SCH MG: 100 CAPSULE ORAL at 09:00

## 2019-05-09 RX ADMIN — ASPIRIN SCH MG: 81 TABLET, CHEWABLE ORAL at 09:00

## 2019-05-09 RX ADMIN — SODIUM CHLORIDE SCH MLS/HR: 9 INJECTION, SOLUTION INTRAVENOUS at 23:20

## 2019-05-09 RX ADMIN — ANORECTAL OINTMENT SCH APPLIC: 15.7; .44; 24; 20.6 OINTMENT TOPICAL at 09:02

## 2019-05-09 RX ADMIN — DOCUSATE SODIUM SCH MG: 100 CAPSULE, LIQUID FILLED ORAL at 21:00

## 2019-05-09 RX ADMIN — POLYETHYLENE GLYCOL 3350 SCH GM: 17 POWDER, FOR SOLUTION ORAL at 09:01

## 2019-05-09 RX ADMIN — SODIUM CHLORIDE SCH MLS/HR: 9 INJECTION, SOLUTION INTRAVENOUS at 11:50

## 2019-05-10 VITALS — DIASTOLIC BLOOD PRESSURE: 64 MMHG | SYSTOLIC BLOOD PRESSURE: 119 MMHG

## 2019-05-10 VITALS — DIASTOLIC BLOOD PRESSURE: 65 MMHG | SYSTOLIC BLOOD PRESSURE: 129 MMHG

## 2019-05-10 VITALS — DIASTOLIC BLOOD PRESSURE: 61 MMHG | SYSTOLIC BLOOD PRESSURE: 130 MMHG

## 2019-05-10 VITALS — SYSTOLIC BLOOD PRESSURE: 133 MMHG | DIASTOLIC BLOOD PRESSURE: 64 MMHG

## 2019-05-10 VITALS — DIASTOLIC BLOOD PRESSURE: 66 MMHG | SYSTOLIC BLOOD PRESSURE: 131 MMHG

## 2019-05-10 LAB
APPEARANCE UR: CLEAR
BASOPHILS # BLD AUTO: 0.1 K/UL (ref 0–8)
BASOPHILS NFR BLD AUTO: 0.9 % (ref 0–2)
BILIRUB UR QL STRIP: NEGATIVE
BUN SERPL-MCNC: 16 MG/DL (ref 7–18)
CHLORIDE SERPL-SCNC: 107 MMOL/L (ref 98–107)
CO2 SERPL-SCNC: 25 MMOL/L (ref 21–32)
COLOR UR: YELLOW
CREAT SERPL-MCNC: 1 MG/DL (ref 0.6–1.3)
DEPRECATED SQUAMOUS URNS QL MICRO: (no result) /HPF
EOSINOPHIL # BLD AUTO: 0.3 K/UL (ref 0–0.7)
EOSINOPHIL NFR BLD AUTO: 3.2 % (ref 0–7)
GLUCOSE SERPL-MCNC: 109 MG/DL (ref 74–106)
GLUCOSE UR STRIP-MCNC: NEGATIVE MG/DL
HCT VFR BLD AUTO: 42.5 % (ref 36.7–47.1)
HGB BLD-MCNC: 14.3 G/DL (ref 12.5–16.3)
HGB UR QL STRIP: NEGATIVE
KETONES UR STRIP-MCNC: NEGATIVE MG/DL
LEUKOCYTE ESTERASE UR QL STRIP: (no result)
LYMPHOCYTES # BLD AUTO: 1.2 K/UL (ref 20–40)
LYMPHOCYTES NFR BLD AUTO: 14.4 % (ref 20.5–51.5)
MCH RBC QN AUTO: 30.1 UUG (ref 23.8–33.4)
MCHC RBC AUTO-ENTMCNC: 34 G/DL (ref 32.5–36.3)
MCV RBC AUTO: 89.7 FL (ref 73–96.2)
MONOCYTES # BLD AUTO: 0.6 K/UL (ref 2–10)
MONOCYTES NFR BLD AUTO: 7.2 % (ref 0–11)
NEUTROPHILS # BLD AUTO: 6.2 K/UL (ref 1.8–8.9)
NEUTROPHILS NFR BLD AUTO: 74.3 % (ref 38.5–71.5)
NITRITE UR QL STRIP: NEGATIVE
PH UR STRIP: 7 [PH] (ref 5–8)
PLATELET # BLD AUTO: 194 K/UL (ref 152–348)
POTASSIUM SERPL-SCNC: 4 MMOL/L (ref 3.5–5.1)
RBC # BLD AUTO: 4.74 MIL/UL (ref 4.06–5.63)
RBC #/AREA URNS HPF: (no result) /HPF (ref 0–3)
SP GR UR STRIP: 1.02 (ref 1–1.03)
UROBILINOGEN UR STRIP-MCNC: 0.2 E.U./DL
WBC # BLD AUTO: 8.3 K/UL (ref 3.6–10.2)
WBC #/AREA URNS HPF: (no result) /HPF

## 2019-05-10 RX ADMIN — SODIUM CHLORIDE SCH MLS/HR: 9 INJECTION, SOLUTION INTRAVENOUS at 23:10

## 2019-05-10 RX ADMIN — DOCUSATE SODIUM SCH MG: 100 CAPSULE, LIQUID FILLED ORAL at 20:19

## 2019-05-10 RX ADMIN — PANTOPRAZOLE SODIUM SCH MG: 40 TABLET, DELAYED RELEASE ORAL at 06:38

## 2019-05-10 RX ADMIN — ANORECTAL OINTMENT SCH APPLIC: 15.7; .44; 24; 20.6 OINTMENT TOPICAL at 20:21

## 2019-05-10 RX ADMIN — Medication SCH EACH: at 09:06

## 2019-05-10 RX ADMIN — SODIUM CHLORIDE SCH MLS/HR: 9 INJECTION, SOLUTION INTRAVENOUS at 12:08

## 2019-05-10 RX ADMIN — ANORECTAL OINTMENT SCH APPLIC: 15.7; .44; 24; 20.6 OINTMENT TOPICAL at 12:08

## 2019-05-10 RX ADMIN — METOPROLOL SUCCINATE SCH MG: 50 TABLET, FILM COATED, EXTENDED RELEASE ORAL at 09:12

## 2019-05-10 RX ADMIN — Medication PRN MG: at 14:23

## 2019-05-10 RX ADMIN — TAMSULOSIN HYDROCHLORIDE SCH MG: 0.4 CAPSULE ORAL at 20:18

## 2019-05-10 RX ADMIN — CELECOXIB SCH MG: 100 CAPSULE ORAL at 17:27

## 2019-05-10 RX ADMIN — ASPIRIN SCH MG: 81 TABLET, CHEWABLE ORAL at 09:06

## 2019-05-10 RX ADMIN — POLYETHYLENE GLYCOL 3350 SCH GM: 17 POWDER, FOR SOLUTION ORAL at 09:12

## 2019-05-10 RX ADMIN — CELECOXIB SCH MG: 100 CAPSULE ORAL at 09:06

## 2019-05-10 RX ADMIN — VITAMIN D, TAB 1000IU (100/BT) SCH UNIT: 25 TAB at 09:06

## 2019-05-11 VITALS — DIASTOLIC BLOOD PRESSURE: 76 MMHG | SYSTOLIC BLOOD PRESSURE: 149 MMHG

## 2019-05-11 VITALS — SYSTOLIC BLOOD PRESSURE: 126 MMHG | DIASTOLIC BLOOD PRESSURE: 68 MMHG

## 2019-05-11 VITALS — DIASTOLIC BLOOD PRESSURE: 67 MMHG | SYSTOLIC BLOOD PRESSURE: 139 MMHG

## 2019-05-11 VITALS — SYSTOLIC BLOOD PRESSURE: 147 MMHG | DIASTOLIC BLOOD PRESSURE: 70 MMHG

## 2019-05-11 VITALS — SYSTOLIC BLOOD PRESSURE: 140 MMHG | DIASTOLIC BLOOD PRESSURE: 68 MMHG

## 2019-05-11 RX ADMIN — CELECOXIB SCH MG: 100 CAPSULE ORAL at 09:04

## 2019-05-11 RX ADMIN — TAMSULOSIN HYDROCHLORIDE SCH MG: 0.4 CAPSULE ORAL at 20:05

## 2019-05-11 RX ADMIN — DOCUSATE SODIUM SCH MG: 100 CAPSULE, LIQUID FILLED ORAL at 20:05

## 2019-05-11 RX ADMIN — SODIUM CHLORIDE SCH MLS/HR: 9 INJECTION, SOLUTION INTRAVENOUS at 22:26

## 2019-05-11 RX ADMIN — QUETIAPINE PRN MG: 25 TABLET, FILM COATED ORAL at 20:46

## 2019-05-11 RX ADMIN — ASPIRIN SCH MG: 81 TABLET, CHEWABLE ORAL at 09:04

## 2019-05-11 RX ADMIN — VITAMIN D, TAB 1000IU (100/BT) SCH UNIT: 25 TAB at 09:04

## 2019-05-11 RX ADMIN — ACETAMINOPHEN PRN MG: 325 TABLET ORAL at 16:55

## 2019-05-11 RX ADMIN — Medication PRN MG: at 09:08

## 2019-05-11 RX ADMIN — PANTOPRAZOLE SODIUM SCH MG: 40 TABLET, DELAYED RELEASE ORAL at 06:14

## 2019-05-11 RX ADMIN — POLYETHYLENE GLYCOL 3350 SCH GM: 17 POWDER, FOR SOLUTION ORAL at 09:04

## 2019-05-11 RX ADMIN — Medication SCH EACH: at 09:04

## 2019-05-11 RX ADMIN — METOPROLOL SUCCINATE SCH MG: 50 TABLET, FILM COATED, EXTENDED RELEASE ORAL at 09:11

## 2019-05-11 RX ADMIN — ANORECTAL OINTMENT SCH APPLIC: 15.7; .44; 24; 20.6 OINTMENT TOPICAL at 09:12

## 2019-05-11 RX ADMIN — CELECOXIB SCH MG: 100 CAPSULE ORAL at 16:52

## 2019-05-11 RX ADMIN — ANORECTAL OINTMENT SCH APPLIC: 15.7; .44; 24; 20.6 OINTMENT TOPICAL at 20:41

## 2019-05-11 RX ADMIN — SODIUM CHLORIDE SCH MLS/HR: 9 INJECTION, SOLUTION INTRAVENOUS at 12:05

## 2019-05-12 VITALS — SYSTOLIC BLOOD PRESSURE: 159 MMHG | DIASTOLIC BLOOD PRESSURE: 73 MMHG

## 2019-05-12 VITALS — DIASTOLIC BLOOD PRESSURE: 81 MMHG | SYSTOLIC BLOOD PRESSURE: 147 MMHG

## 2019-05-12 VITALS — DIASTOLIC BLOOD PRESSURE: 51 MMHG | SYSTOLIC BLOOD PRESSURE: 101 MMHG

## 2019-05-12 RX ADMIN — SODIUM CHLORIDE SCH MLS/HR: 9 INJECTION, SOLUTION INTRAVENOUS at 23:13

## 2019-05-12 RX ADMIN — SODIUM CHLORIDE SCH MLS/HR: 9 INJECTION, SOLUTION INTRAVENOUS at 10:36

## 2019-05-12 RX ADMIN — POLYETHYLENE GLYCOL 3350 SCH GM: 17 POWDER, FOR SOLUTION ORAL at 08:55

## 2019-05-12 RX ADMIN — QUETIAPINE PRN MG: 25 TABLET, FILM COATED ORAL at 17:55

## 2019-05-12 RX ADMIN — TAMSULOSIN HYDROCHLORIDE SCH MG: 0.4 CAPSULE ORAL at 20:26

## 2019-05-12 RX ADMIN — ANORECTAL OINTMENT SCH APPLIC: 15.7; .44; 24; 20.6 OINTMENT TOPICAL at 20:26

## 2019-05-12 RX ADMIN — PANTOPRAZOLE SODIUM SCH MG: 40 TABLET, DELAYED RELEASE ORAL at 06:36

## 2019-05-12 RX ADMIN — Medication SCH EACH: at 08:54

## 2019-05-12 RX ADMIN — DOCUSATE SODIUM SCH MG: 100 CAPSULE, LIQUID FILLED ORAL at 20:26

## 2019-05-12 RX ADMIN — ACETAMINOPHEN PRN MG: 325 TABLET ORAL at 20:28

## 2019-05-12 RX ADMIN — CELECOXIB SCH MG: 100 CAPSULE ORAL at 08:54

## 2019-05-12 RX ADMIN — METOPROLOL SUCCINATE SCH MG: 50 TABLET, FILM COATED, EXTENDED RELEASE ORAL at 08:54

## 2019-05-12 RX ADMIN — ANORECTAL OINTMENT SCH APPLIC: 15.7; .44; 24; 20.6 OINTMENT TOPICAL at 08:55

## 2019-05-12 RX ADMIN — ASPIRIN SCH MG: 81 TABLET, CHEWABLE ORAL at 08:54

## 2019-05-12 RX ADMIN — CELECOXIB SCH MG: 100 CAPSULE ORAL at 17:55

## 2019-05-12 RX ADMIN — VITAMIN D, TAB 1000IU (100/BT) SCH UNIT: 25 TAB at 08:54

## 2019-05-13 VITALS — SYSTOLIC BLOOD PRESSURE: 135 MMHG | DIASTOLIC BLOOD PRESSURE: 66 MMHG

## 2019-05-13 VITALS — SYSTOLIC BLOOD PRESSURE: 126 MMHG | DIASTOLIC BLOOD PRESSURE: 65 MMHG

## 2019-05-13 VITALS — DIASTOLIC BLOOD PRESSURE: 58 MMHG | SYSTOLIC BLOOD PRESSURE: 112 MMHG

## 2019-05-13 RX ADMIN — ASPIRIN SCH MG: 81 TABLET, CHEWABLE ORAL at 08:16

## 2019-05-13 RX ADMIN — Medication SCH EACH: at 08:16

## 2019-05-13 RX ADMIN — VITAMIN D, TAB 1000IU (100/BT) SCH UNIT: 25 TAB at 08:16

## 2019-05-13 RX ADMIN — POLYETHYLENE GLYCOL 3350 SCH GM: 17 POWDER, FOR SOLUTION ORAL at 08:16

## 2019-05-13 RX ADMIN — METOPROLOL SUCCINATE SCH MG: 50 TABLET, FILM COATED, EXTENDED RELEASE ORAL at 08:17

## 2019-05-13 RX ADMIN — CELECOXIB SCH MG: 100 CAPSULE ORAL at 16:43

## 2019-05-13 RX ADMIN — PANTOPRAZOLE SODIUM SCH MG: 40 TABLET, DELAYED RELEASE ORAL at 06:11

## 2019-05-13 RX ADMIN — ANORECTAL OINTMENT SCH APPLIC: 15.7; .44; 24; 20.6 OINTMENT TOPICAL at 08:17

## 2019-05-13 RX ADMIN — SODIUM CHLORIDE SCH MLS/HR: 9 INJECTION, SOLUTION INTRAVENOUS at 10:17

## 2019-05-13 RX ADMIN — CELECOXIB SCH MG: 100 CAPSULE ORAL at 08:16

## 2019-09-02 ENCOUNTER — HOSPITAL ENCOUNTER (INPATIENT)
Dept: HOSPITAL 91 - 6WM | Age: 84
LOS: 10 days | Discharge: NURSING, REHAB OR LONG-TERM CARE | DRG: 689 | End: 2019-09-12
Payer: COMMERCIAL

## 2019-09-02 ENCOUNTER — HOSPITAL ENCOUNTER (INPATIENT)
Dept: HOSPITAL 10 - E/R | Age: 84
LOS: 10 days | Discharge: NURSING, REHAB OR LONG-TERM CARE | DRG: 689 | End: 2019-09-12
Attending: INTERNAL MEDICINE
Payer: MEDICARE

## 2019-09-02 VITALS
WEIGHT: 165.35 LBS | HEIGHT: 67 IN | WEIGHT: 165.35 LBS | HEIGHT: 67 IN | BODY MASS INDEX: 25.95 KG/M2 | BODY MASS INDEX: 25.95 KG/M2

## 2019-09-02 DIAGNOSIS — E11.8: ICD-10-CM

## 2019-09-02 DIAGNOSIS — N17.9: ICD-10-CM

## 2019-09-02 DIAGNOSIS — F01.50: ICD-10-CM

## 2019-09-02 DIAGNOSIS — I10: ICD-10-CM

## 2019-09-02 DIAGNOSIS — G92: ICD-10-CM

## 2019-09-02 DIAGNOSIS — N39.0: Primary | ICD-10-CM

## 2019-09-02 DIAGNOSIS — N40.0: ICD-10-CM

## 2019-09-02 DIAGNOSIS — G93.89: ICD-10-CM

## 2019-09-02 DIAGNOSIS — I69.954: ICD-10-CM

## 2019-09-02 LAB
ADD MAN DIFF?: NO
ADD UMIC: YES
ALANINE AMINOTRANSFERASE: 18 IU/L (ref 13–69)
ALBUMIN/GLOBULIN RATIO: 1.21
ALBUMIN: 4.5 G/DL (ref 3.3–4.9)
ALKALINE PHOSPHATASE: 64 IU/L (ref 42–121)
ANION GAP: 9 (ref 5–13)
ASPARTATE AMINO TRANSFERASE: 22 IU/L (ref 15–46)
BASOPHIL #: 0.1 10^3/UL (ref 0–0.1)
BASOPHILS %: 0.3 % (ref 0–2)
BILIRUBIN,DIRECT: 0 MG/DL (ref 0–0.2)
BILIRUBIN,TOTAL: 0.6 MG/DL (ref 0.2–1.3)
BLOOD UREA NITROGEN: 25 MG/DL (ref 7–20)
CALCIUM: 10.1 MG/DL (ref 8.4–10.2)
CARBON DIOXIDE: 27 MMOL/L (ref 21–31)
CHLORIDE: 104 MMOL/L (ref 97–110)
CREATININE: 1.42 MG/DL (ref 0.61–1.24)
EOSINOPHILS #: 0.2 10^3/UL (ref 0–0.5)
EOSINOPHILS %: 1.3 % (ref 0–7)
GLOBULIN: 3.7 G/DL (ref 1.3–3.2)
GLUCOSE: 115 MG/DL (ref 70–220)
HEMATOCRIT: 48.3 % (ref 42–52)
HEMOGLOBIN: 15.5 G/DL (ref 14–18)
IMMATURE GRANS #M: 0.09 10^3/UL (ref 0–0.03)
IMMATURE GRANS % (M): 0.5 % (ref 0–0.43)
LACTIC ACID: 1.7 MMOL/L (ref 0.5–2)
LYMPHOCYTES #: 1.8 10^3/UL (ref 0.8–2.9)
LYMPHOCYTES %: 10 % (ref 15–51)
MEAN CORPUSCULAR HEMOGLOBIN: 30.2 PG (ref 29–33)
MEAN CORPUSCULAR HGB CONC: 32.1 G/DL (ref 32–37)
MEAN CORPUSCULAR VOLUME: 94 FL (ref 82–101)
MEAN PLATELET VOLUME: 9.6 FL (ref 7.4–10.4)
MONOCYTE #: 1.4 10^3/UL (ref 0.3–0.9)
MONOCYTES %: 7.8 % (ref 0–11)
NEUTROPHIL #: 14.6 10^3/UL (ref 1.6–7.5)
NEUTROPHILS %: 80.1 % (ref 39–77)
NUCLEATED RED BLOOD CELLS #: 0 10^3/UL (ref 0–0)
NUCLEATED RED BLOOD CELLS%: 0 /100WBC (ref 0–0)
PLATELET COUNT: 217 10^3/UL (ref 140–415)
POTASSIUM: 4.7 MMOL/L (ref 3.5–5.1)
RED BLOOD COUNT: 5.14 10^6/UL (ref 4.7–6.1)
RED CELL DISTRIBUTION WIDTH: 13.3 % (ref 11.5–14.5)
SODIUM: 140 MMOL/L (ref 135–144)
TOTAL PROTEIN: 8.2 G/DL (ref 6.1–8.1)
UR ASCORBIC ACID: NEGATIVE MG/DL
UR BILIRUBIN (DIP): NEGATIVE MG/DL
UR BLOOD (DIP): (no result) MG/DL
UR CLARITY: (no result)
UR COLOR: YELLOW
UR GLUCOSE (DIP): NEGATIVE MG/DL
UR KETONES (DIP): (no result) MG/DL
UR LEUKOCYTE ESTERASE (DIP): (no result) LEU/UL
UR NITRITE (DIP): POSITIVE MG/DL
UR PH (DIP): 6 (ref 5–9)
UR RBC: 5 /HPF (ref 0–5)
UR SPECIFIC GRAVITY (DIP): 1.01 (ref 1–1.03)
UR TOTAL PROTEIN (DIP): NEGATIVE MG/DL
UR UROBILINOGEN (DIP): NEGATIVE MG/DL
UR WBC: > 182 /HPF (ref 0–5)
WHITE BLOOD COUNT: 18.2 10^3/UL (ref 4.8–10.8)

## 2019-09-02 PROCEDURE — 70551 MRI BRAIN STEM W/O DYE: CPT

## 2019-09-02 PROCEDURE — 93005 ELECTROCARDIOGRAM TRACING: CPT

## 2019-09-02 PROCEDURE — 97161 PT EVAL LOW COMPLEX 20 MIN: CPT

## 2019-09-02 PROCEDURE — 76856 US EXAM PELVIC COMPLETE: CPT

## 2019-09-02 PROCEDURE — 81001 URINALYSIS AUTO W/SCOPE: CPT

## 2019-09-02 PROCEDURE — 92526 ORAL FUNCTION THERAPY: CPT

## 2019-09-02 PROCEDURE — 83735 ASSAY OF MAGNESIUM: CPT

## 2019-09-02 PROCEDURE — 80053 COMPREHEN METABOLIC PANEL: CPT

## 2019-09-02 PROCEDURE — 96374 THER/PROPH/DIAG INJ IV PUSH: CPT

## 2019-09-02 PROCEDURE — 99285 EMERGENCY DEPT VISIT HI MDM: CPT

## 2019-09-02 PROCEDURE — 71045 X-RAY EXAM CHEST 1 VIEW: CPT

## 2019-09-02 PROCEDURE — 96375 TX/PRO/DX INJ NEW DRUG ADDON: CPT

## 2019-09-02 PROCEDURE — 80069 RENAL FUNCTION PANEL: CPT

## 2019-09-02 PROCEDURE — 85025 COMPLETE CBC W/AUTO DIFF WBC: CPT

## 2019-09-02 PROCEDURE — 87086 URINE CULTURE/COLONY COUNT: CPT

## 2019-09-02 PROCEDURE — 93306 TTE W/DOPPLER COMPLETE: CPT

## 2019-09-02 PROCEDURE — 83605 ASSAY OF LACTIC ACID: CPT

## 2019-09-02 PROCEDURE — 87040 BLOOD CULTURE FOR BACTERIA: CPT

## 2019-09-02 PROCEDURE — 92610 EVALUATE SWALLOWING FUNCTION: CPT

## 2019-09-02 PROCEDURE — 36415 COLL VENOUS BLD VENIPUNCTURE: CPT

## 2019-09-02 PROCEDURE — 70450 CT HEAD/BRAIN W/O DYE: CPT

## 2019-09-02 PROCEDURE — 97530 THERAPEUTIC ACTIVITIES: CPT

## 2019-09-02 PROCEDURE — 80061 LIPID PANEL: CPT

## 2019-09-02 PROCEDURE — 97110 THERAPEUTIC EXERCISES: CPT

## 2019-09-02 PROCEDURE — 83036 HEMOGLOBIN GLYCOSYLATED A1C: CPT

## 2019-09-02 RX ADMIN — CEFEPIME 1 MLS/HR: 1 INJECTION, POWDER, FOR SOLUTION INTRAMUSCULAR; INTRAVENOUS at 18:27

## 2019-09-02 RX ADMIN — VANCOMYCIN HYDROCHLORIDE 1 MLS/HR: 1 INJECTION, POWDER, LYOPHILIZED, FOR SOLUTION INTRAVENOUS at 18:50

## 2019-09-02 RX ADMIN — ACETAMINOPHEN 1 MG: 500 TABLET, FILM COATED ORAL at 19:13

## 2019-09-02 RX ADMIN — THIAMINE HYDROCHLORIDE 1 MLS/HR: 100 INJECTION, SOLUTION INTRAMUSCULAR; INTRAVENOUS at 18:27

## 2019-09-03 VITALS — RESPIRATION RATE: 18 BRPM | SYSTOLIC BLOOD PRESSURE: 136 MMHG | DIASTOLIC BLOOD PRESSURE: 67 MMHG

## 2019-09-03 VITALS — SYSTOLIC BLOOD PRESSURE: 135 MMHG | DIASTOLIC BLOOD PRESSURE: 70 MMHG | RESPIRATION RATE: 20 BRPM | HEART RATE: 74 BPM

## 2019-09-03 VITALS — SYSTOLIC BLOOD PRESSURE: 136 MMHG | DIASTOLIC BLOOD PRESSURE: 99 MMHG | RESPIRATION RATE: 16 BRPM | HEART RATE: 77 BPM

## 2019-09-03 VITALS — RESPIRATION RATE: 20 BRPM | DIASTOLIC BLOOD PRESSURE: 72 MMHG | HEART RATE: 88 BPM | SYSTOLIC BLOOD PRESSURE: 133 MMHG

## 2019-09-03 VITALS — DIASTOLIC BLOOD PRESSURE: 73 MMHG | RESPIRATION RATE: 20 BRPM | SYSTOLIC BLOOD PRESSURE: 135 MMHG | HEART RATE: 70 BPM

## 2019-09-03 VITALS — SYSTOLIC BLOOD PRESSURE: 150 MMHG | HEART RATE: 79 BPM | DIASTOLIC BLOOD PRESSURE: 70 MMHG | RESPIRATION RATE: 20 BRPM

## 2019-09-03 LAB
ADD MAN DIFF?: NO
ALANINE AMINOTRANSFERASE: 14 IU/L (ref 13–69)
ALBUMIN/GLOBULIN RATIO: 1.02
ALBUMIN: 3.7 G/DL (ref 3.3–4.9)
ALKALINE PHOSPHATASE: 66 IU/L (ref 42–121)
ANION GAP: 10 (ref 5–13)
ASPARTATE AMINO TRANSFERASE: 22 IU/L (ref 15–46)
BASOPHIL #: 0.1 10^3/UL (ref 0–0.1)
BASOPHILS %: 0.3 % (ref 0–2)
BILIRUBIN,DIRECT: 0 MG/DL (ref 0–0.2)
BILIRUBIN,TOTAL: 0.7 MG/DL (ref 0.2–1.3)
BLOOD UREA NITROGEN: 22 MG/DL (ref 7–20)
CALCIUM: 9.2 MG/DL (ref 8.4–10.2)
CARBON DIOXIDE: 23 MMOL/L (ref 21–31)
CHLORIDE: 108 MMOL/L (ref 97–110)
CREATININE: 1.17 MG/DL (ref 0.61–1.24)
EOSINOPHILS #: 0.2 10^3/UL (ref 0–0.5)
EOSINOPHILS %: 1.6 % (ref 0–7)
GLOBULIN: 3.6 G/DL (ref 1.3–3.2)
GLUCOSE: 114 MG/DL (ref 70–220)
HEMATOCRIT: 45.4 % (ref 42–52)
HEMOGLOBIN A1C: 5.5 % (ref 0–5.9)
HEMOGLOBIN: 14.5 G/DL (ref 14–18)
IMMATURE GRANS #M: 0.07 10^3/UL (ref 0–0.03)
IMMATURE GRANS % (M): 0.5 % (ref 0–0.43)
LYMPHOCYTES #: 1.2 10^3/UL (ref 0.8–2.9)
LYMPHOCYTES %: 7.8 % (ref 15–51)
MAGNESIUM: 2.1 MG/DL (ref 1.7–2.5)
MEAN CORPUSCULAR HEMOGLOBIN: 29.6 PG (ref 29–33)
MEAN CORPUSCULAR HGB CONC: 31.9 G/DL (ref 32–37)
MEAN CORPUSCULAR VOLUME: 92.7 FL (ref 82–101)
MEAN PLATELET VOLUME: 9.7 FL (ref 7.4–10.4)
MONOCYTE #: 1.1 10^3/UL (ref 0.3–0.9)
MONOCYTES %: 7.5 % (ref 0–11)
NEUTROPHIL #: 12.1 10^3/UL (ref 1.6–7.5)
NEUTROPHILS %: 82.3 % (ref 39–77)
NUCLEATED RED BLOOD CELLS #: 0 10^3/UL (ref 0–0)
NUCLEATED RED BLOOD CELLS%: 0 /100WBC (ref 0–0)
PLATELET COUNT: 197 10^3/UL (ref 140–415)
POTASSIUM: 4.1 MMOL/L (ref 3.5–5.1)
RED BLOOD COUNT: 4.9 10^6/UL (ref 4.7–6.1)
RED CELL DISTRIBUTION WIDTH: 13.5 % (ref 11.5–14.5)
SODIUM: 141 MMOL/L (ref 135–144)
TOTAL PROTEIN: 7.3 G/DL (ref 6.1–8.1)
WHITE BLOOD COUNT: 14.7 10^3/UL (ref 4.8–10.8)

## 2019-09-03 RX ADMIN — ATORVASTATIN CALCIUM SCH MG: 10 TABLET, FILM COATED ORAL at 20:34

## 2019-09-03 RX ADMIN — EZETIMIBE 1 MG: 10 TABLET ORAL at 20:34

## 2019-09-03 RX ADMIN — GABAPENTIN 1 MG: 100 CAPSULE ORAL at 20:34

## 2019-09-03 RX ADMIN — GABAPENTIN 1 MG: 100 CAPSULE ORAL at 08:54

## 2019-09-03 RX ADMIN — AMLODIPINE BESYLATE 1 MG: 10 TABLET ORAL at 08:55

## 2019-09-03 RX ADMIN — GABAPENTIN SCH MG: 100 CAPSULE ORAL at 08:54

## 2019-09-03 RX ADMIN — TAMSULOSIN HYDROCHLORIDE SCH MG: 0.4 CAPSULE ORAL at 20:34

## 2019-09-03 RX ADMIN — MEROPENEM SCH MLS/HR: 1 INJECTION, POWDER, FOR SOLUTION INTRAVENOUS at 10:45

## 2019-09-03 RX ADMIN — ATORVASTATIN CALCIUM 1 MG: 10 TABLET, FILM COATED ORAL at 20:34

## 2019-09-03 RX ADMIN — MEROPENEM 1 MLS/HR: 1 INJECTION, POWDER, FOR SOLUTION INTRAVENOUS at 20:34

## 2019-09-03 RX ADMIN — GABAPENTIN SCH MG: 100 CAPSULE ORAL at 20:34

## 2019-09-03 RX ADMIN — VITAMIN D, TAB 1000IU (100/BT) 1 UNIT: 25 TAB at 08:55

## 2019-09-03 RX ADMIN — AMLODIPINE BESYLATE SCH MG: 10 TABLET ORAL at 08:55

## 2019-09-03 RX ADMIN — METOPROLOL SUCCINATE 1 MG: 100 TABLET, EXTENDED RELEASE ORAL at 08:54

## 2019-09-03 RX ADMIN — ASPIRIN SCH MG: 81 TABLET, COATED ORAL at 08:54

## 2019-09-03 RX ADMIN — MEROPENEM 1 MLS/HR: 1 INJECTION, POWDER, FOR SOLUTION INTRAVENOUS at 10:45

## 2019-09-03 RX ADMIN — THIAMINE HYDROCHLORIDE 1 MLS/HR: 100 INJECTION, SOLUTION INTRAMUSCULAR; INTRAVENOUS at 00:21

## 2019-09-03 RX ADMIN — TAMSULOSIN HYDROCHLORIDE 1 MG: 0.4 CAPSULE ORAL at 20:34

## 2019-09-03 RX ADMIN — MEROPENEM SCH MLS/HR: 1 INJECTION, POWDER, FOR SOLUTION INTRAVENOUS at 20:34

## 2019-09-03 RX ADMIN — ASPIRIN 1 MG: 81 TABLET, COATED ORAL at 08:54

## 2019-09-03 RX ADMIN — MEROPENEM SCH MLS/HR: 1 INJECTION, POWDER, FOR SOLUTION INTRAVENOUS at 03:29

## 2019-09-03 RX ADMIN — METOPROLOL SUCCINATE SCH MG: 100 TABLET, FILM COATED, EXTENDED RELEASE ORAL at 08:54

## 2019-09-03 RX ADMIN — VITAMIN D, TAB 1000IU (100/BT) SCH UNIT: 25 TAB at 08:55

## 2019-09-03 RX ADMIN — EZETIMIBE SCH MG: 10 TABLET ORAL at 20:34

## 2019-09-03 RX ADMIN — MEROPENEM 1 MLS/HR: 1 INJECTION, POWDER, FOR SOLUTION INTRAVENOUS at 03:29

## 2019-09-04 VITALS — HEART RATE: 79 BPM | RESPIRATION RATE: 17 BRPM | SYSTOLIC BLOOD PRESSURE: 112 MMHG | DIASTOLIC BLOOD PRESSURE: 61 MMHG

## 2019-09-04 VITALS — DIASTOLIC BLOOD PRESSURE: 61 MMHG | RESPIRATION RATE: 18 BRPM | SYSTOLIC BLOOD PRESSURE: 130 MMHG | HEART RATE: 63 BPM

## 2019-09-04 VITALS — HEART RATE: 64 BPM | SYSTOLIC BLOOD PRESSURE: 128 MMHG | DIASTOLIC BLOOD PRESSURE: 61 MMHG | RESPIRATION RATE: 18 BRPM

## 2019-09-04 VITALS — DIASTOLIC BLOOD PRESSURE: 67 MMHG | HEART RATE: 72 BPM | SYSTOLIC BLOOD PRESSURE: 126 MMHG | RESPIRATION RATE: 20 BRPM

## 2019-09-04 VITALS — HEART RATE: 76 BPM | RESPIRATION RATE: 18 BRPM | SYSTOLIC BLOOD PRESSURE: 114 MMHG | DIASTOLIC BLOOD PRESSURE: 64 MMHG

## 2019-09-04 VITALS — SYSTOLIC BLOOD PRESSURE: 123 MMHG | DIASTOLIC BLOOD PRESSURE: 66 MMHG | HEART RATE: 78 BPM | RESPIRATION RATE: 18 BRPM

## 2019-09-04 LAB
ADD MAN DIFF?: NO
ALBUMIN: 3.4 G/DL (ref 3.3–4.9)
ANION GAP: 6 (ref 5–13)
BASOPHIL #: 0.1 10^3/UL (ref 0–0.1)
BASOPHILS %: 0.6 % (ref 0–2)
BLOOD UREA NITROGEN: 23 MG/DL (ref 7–20)
CALCIUM: 9.2 MG/DL (ref 8.4–10.2)
CARBON DIOXIDE: 24 MMOL/L (ref 21–31)
CHLORIDE: 110 MMOL/L (ref 97–110)
CREATININE: 1.2 MG/DL (ref 0.61–1.24)
EOSINOPHILS #: 0.4 10^3/UL (ref 0–0.5)
EOSINOPHILS %: 3.5 % (ref 0–7)
GLUCOSE: 115 MG/DL (ref 70–220)
HEMATOCRIT: 43.5 % (ref 42–52)
HEMOGLOBIN: 14 G/DL (ref 14–18)
IMMATURE GRANS #M: 0.05 10^3/UL (ref 0–0.03)
IMMATURE GRANS % (M): 0.5 % (ref 0–0.43)
LYMPHOCYTES #: 1.4 10^3/UL (ref 0.8–2.9)
LYMPHOCYTES %: 12.9 % (ref 15–51)
MAGNESIUM: 2.3 MG/DL (ref 1.7–2.5)
MEAN CORPUSCULAR HEMOGLOBIN: 30.1 PG (ref 29–33)
MEAN CORPUSCULAR HGB CONC: 32.2 G/DL (ref 32–37)
MEAN CORPUSCULAR VOLUME: 93.5 FL (ref 82–101)
MEAN PLATELET VOLUME: 9.8 FL (ref 7.4–10.4)
MONOCYTE #: 0.9 10^3/UL (ref 0.3–0.9)
MONOCYTES %: 8.1 % (ref 0–11)
NEUTROPHIL #: 8.1 10^3/UL (ref 1.6–7.5)
NEUTROPHILS %: 74.4 % (ref 39–77)
NUCLEATED RED BLOOD CELLS #: 0 10^3/UL (ref 0–0)
NUCLEATED RED BLOOD CELLS%: 0 /100WBC (ref 0–0)
PHOSPHORUS: 3.7 MG/DL (ref 2.5–4.9)
PLATELET COUNT: 185 10^3/UL (ref 140–415)
POTASSIUM: 4 MMOL/L (ref 3.5–5.1)
RED BLOOD COUNT: 4.65 10^6/UL (ref 4.7–6.1)
RED CELL DISTRIBUTION WIDTH: 13.2 % (ref 11.5–14.5)
SODIUM: 140 MMOL/L (ref 135–144)
WHITE BLOOD COUNT: 10.9 10^3/UL (ref 4.8–10.8)

## 2019-09-04 RX ADMIN — MEROPENEM 1 MLS/HR: 1 INJECTION, POWDER, FOR SOLUTION INTRAVENOUS at 21:23

## 2019-09-04 RX ADMIN — ASPIRIN SCH MG: 81 TABLET, COATED ORAL at 08:13

## 2019-09-04 RX ADMIN — VITAMIN D, TAB 1000IU (100/BT) SCH UNIT: 25 TAB at 08:13

## 2019-09-04 RX ADMIN — ONDANSETRON HYDROCHLORIDE 1 MG: 2 INJECTION, SOLUTION INTRAMUSCULAR; INTRAVENOUS at 14:57

## 2019-09-04 RX ADMIN — GABAPENTIN SCH MG: 100 CAPSULE ORAL at 08:13

## 2019-09-04 RX ADMIN — MEROPENEM 1 MLS/HR: 1 INJECTION, POWDER, FOR SOLUTION INTRAVENOUS at 08:14

## 2019-09-04 RX ADMIN — VITAMIN D, TAB 1000IU (100/BT) 1 UNIT: 25 TAB at 08:13

## 2019-09-04 RX ADMIN — BISACODYL 1 MG: 5 TABLET, COATED ORAL at 18:33

## 2019-09-04 RX ADMIN — MEROPENEM SCH MLS/HR: 1 INJECTION, POWDER, FOR SOLUTION INTRAVENOUS at 21:23

## 2019-09-04 RX ADMIN — GABAPENTIN SCH MG: 100 CAPSULE ORAL at 21:19

## 2019-09-04 RX ADMIN — ATORVASTATIN CALCIUM 1 MG: 10 TABLET, FILM COATED ORAL at 21:19

## 2019-09-04 RX ADMIN — AMLODIPINE BESYLATE 1 MG: 10 TABLET ORAL at 08:13

## 2019-09-04 RX ADMIN — EZETIMIBE SCH MG: 10 TABLET ORAL at 21:19

## 2019-09-04 RX ADMIN — ATORVASTATIN CALCIUM SCH MG: 10 TABLET, FILM COATED ORAL at 21:19

## 2019-09-04 RX ADMIN — METOPROLOL SUCCINATE SCH MG: 100 TABLET, FILM COATED, EXTENDED RELEASE ORAL at 08:13

## 2019-09-04 RX ADMIN — GABAPENTIN 1 MG: 100 CAPSULE ORAL at 08:13

## 2019-09-04 RX ADMIN — EZETIMIBE 1 MG: 10 TABLET ORAL at 21:19

## 2019-09-04 RX ADMIN — GABAPENTIN 1 MG: 100 CAPSULE ORAL at 21:19

## 2019-09-04 RX ADMIN — MEROPENEM SCH MLS/HR: 1 INJECTION, POWDER, FOR SOLUTION INTRAVENOUS at 08:14

## 2019-09-04 RX ADMIN — ASPIRIN 1 MG: 81 TABLET, COATED ORAL at 08:13

## 2019-09-04 RX ADMIN — METOPROLOL SUCCINATE 1 MG: 100 TABLET, EXTENDED RELEASE ORAL at 08:13

## 2019-09-04 RX ADMIN — TAMSULOSIN HYDROCHLORIDE SCH MG: 0.4 CAPSULE ORAL at 21:19

## 2019-09-04 RX ADMIN — TAMSULOSIN HYDROCHLORIDE 1 MG: 0.4 CAPSULE ORAL at 21:19

## 2019-09-04 RX ADMIN — AMLODIPINE BESYLATE SCH MG: 10 TABLET ORAL at 08:13

## 2019-09-05 VITALS — HEART RATE: 71 BPM | RESPIRATION RATE: 19 BRPM | SYSTOLIC BLOOD PRESSURE: 115 MMHG | DIASTOLIC BLOOD PRESSURE: 75 MMHG

## 2019-09-05 VITALS — RESPIRATION RATE: 18 BRPM | SYSTOLIC BLOOD PRESSURE: 114 MMHG | DIASTOLIC BLOOD PRESSURE: 56 MMHG | HEART RATE: 69 BPM

## 2019-09-05 VITALS — RESPIRATION RATE: 18 BRPM | DIASTOLIC BLOOD PRESSURE: 65 MMHG | SYSTOLIC BLOOD PRESSURE: 128 MMHG | HEART RATE: 67 BPM

## 2019-09-05 VITALS — RESPIRATION RATE: 18 BRPM | DIASTOLIC BLOOD PRESSURE: 57 MMHG | HEART RATE: 67 BPM | SYSTOLIC BLOOD PRESSURE: 119 MMHG

## 2019-09-05 VITALS — DIASTOLIC BLOOD PRESSURE: 76 MMHG | RESPIRATION RATE: 18 BRPM | HEART RATE: 79 BPM | SYSTOLIC BLOOD PRESSURE: 140 MMHG

## 2019-09-05 VITALS — SYSTOLIC BLOOD PRESSURE: 133 MMHG | RESPIRATION RATE: 18 BRPM | HEART RATE: 71 BPM | DIASTOLIC BLOOD PRESSURE: 68 MMHG

## 2019-09-05 LAB
ADD MAN DIFF?: NO
ALBUMIN: 3.3 G/DL (ref 3.3–4.9)
ANION GAP: 5 (ref 5–13)
BASOPHIL #: 0 10^3/UL (ref 0–0.1)
BASOPHILS %: 0.4 % (ref 0–2)
BLOOD UREA NITROGEN: 28 MG/DL (ref 7–20)
CALCIUM: 9.1 MG/DL (ref 8.4–10.2)
CARBON DIOXIDE: 26 MMOL/L (ref 21–31)
CHLORIDE: 107 MMOL/L (ref 97–110)
CHOL/HDL RATIO: 6.1 RATIO
CHOLESTEROL: 161 MG/DL (ref 100–200)
CREATININE: 1.08 MG/DL (ref 0.61–1.24)
EOSINOPHILS #: 0.4 10^3/UL (ref 0–0.5)
EOSINOPHILS %: 4.2 % (ref 0–7)
GLUCOSE: 131 MG/DL (ref 70–220)
HDL CHOLESTEROL: 26 MG/DL (ref 31–75)
HEMATOCRIT: 42.9 % (ref 42–52)
HEMOGLOBIN: 14 G/DL (ref 14–18)
IMMATURE GRANS #M: 0.04 10^3/UL (ref 0–0.03)
IMMATURE GRANS % (M): 0.4 % (ref 0–0.43)
LDL CHOLESTEROL,CALCULATED: 108 MG/DL
LYMPHOCYTES #: 1.2 10^3/UL (ref 0.8–2.9)
LYMPHOCYTES %: 12.7 % (ref 15–51)
MAGNESIUM: 2.1 MG/DL (ref 1.7–2.5)
MEAN CORPUSCULAR HEMOGLOBIN: 30.2 PG (ref 29–33)
MEAN CORPUSCULAR HGB CONC: 32.6 G/DL (ref 32–37)
MEAN CORPUSCULAR VOLUME: 92.7 FL (ref 82–101)
MEAN PLATELET VOLUME: 9.5 FL (ref 7.4–10.4)
MONOCYTE #: 0.7 10^3/UL (ref 0.3–0.9)
MONOCYTES %: 7 % (ref 0–11)
NEUTROPHIL #: 7.3 10^3/UL (ref 1.6–7.5)
NEUTROPHILS %: 75.3 % (ref 39–77)
NUCLEATED RED BLOOD CELLS #: 0 10^3/UL (ref 0–0)
NUCLEATED RED BLOOD CELLS%: 0 /100WBC (ref 0–0)
PHOSPHORUS: 2.8 MG/DL (ref 2.5–4.9)
PLATELET COUNT: 218 10^3/UL (ref 140–415)
POTASSIUM: 4.1 MMOL/L (ref 3.5–5.1)
RED BLOOD COUNT: 4.63 10^6/UL (ref 4.7–6.1)
RED CELL DISTRIBUTION WIDTH: 12.9 % (ref 11.5–14.5)
SODIUM: 138 MMOL/L (ref 135–144)
TRIGLYCERIDES: 136 MG/DL (ref 0–149)
WHITE BLOOD COUNT: 9.7 10^3/UL (ref 4.8–10.8)

## 2019-09-05 RX ADMIN — ATORVASTATIN CALCIUM 1 MG: 10 TABLET, FILM COATED ORAL at 21:39

## 2019-09-05 RX ADMIN — AMLODIPINE BESYLATE SCH MG: 10 TABLET ORAL at 09:15

## 2019-09-05 RX ADMIN — PIPERACILLIN SODIUM AND TAZOBACTAM SODIUM SCH MLS/HR: 3; .375 INJECTION, POWDER, LYOPHILIZED, FOR SOLUTION INTRAVENOUS at 18:24

## 2019-09-05 RX ADMIN — EZETIMIBE 1 MG: 10 TABLET ORAL at 21:38

## 2019-09-05 RX ADMIN — GABAPENTIN 1 MG: 100 CAPSULE ORAL at 21:38

## 2019-09-05 RX ADMIN — PIPERACILLIN SODIUM AND TAZOBACTAM SODIUM 1 MLS/HR: 3; .375 INJECTION, POWDER, LYOPHILIZED, FOR SOLUTION INTRAVENOUS at 18:24

## 2019-09-05 RX ADMIN — METOPROLOL SUCCINATE SCH MG: 100 TABLET, FILM COATED, EXTENDED RELEASE ORAL at 09:15

## 2019-09-05 RX ADMIN — AMLODIPINE BESYLATE 1 MG: 10 TABLET ORAL at 09:15

## 2019-09-05 RX ADMIN — VITAMIN D, TAB 1000IU (100/BT) 1 UNIT: 25 TAB at 09:15

## 2019-09-05 RX ADMIN — ATORVASTATIN CALCIUM SCH MG: 10 TABLET, FILM COATED ORAL at 21:39

## 2019-09-05 RX ADMIN — METOPROLOL SUCCINATE 1 MG: 100 TABLET, EXTENDED RELEASE ORAL at 09:15

## 2019-09-05 RX ADMIN — TAMSULOSIN HYDROCHLORIDE 1 MG: 0.4 CAPSULE ORAL at 21:38

## 2019-09-05 RX ADMIN — GABAPENTIN SCH MG: 100 CAPSULE ORAL at 09:15

## 2019-09-05 RX ADMIN — GABAPENTIN SCH MG: 100 CAPSULE ORAL at 21:38

## 2019-09-05 RX ADMIN — ASPIRIN 1 MG: 81 TABLET, COATED ORAL at 09:15

## 2019-09-05 RX ADMIN — GABAPENTIN 1 MG: 100 CAPSULE ORAL at 09:15

## 2019-09-05 RX ADMIN — MEROPENEM SCH MLS/HR: 1 INJECTION, POWDER, FOR SOLUTION INTRAVENOUS at 09:15

## 2019-09-05 RX ADMIN — ASPIRIN SCH MG: 81 TABLET, COATED ORAL at 09:15

## 2019-09-05 RX ADMIN — EZETIMIBE SCH MG: 10 TABLET ORAL at 21:38

## 2019-09-05 RX ADMIN — VITAMIN D, TAB 1000IU (100/BT) SCH UNIT: 25 TAB at 09:15

## 2019-09-05 RX ADMIN — TAMSULOSIN HYDROCHLORIDE SCH MG: 0.4 CAPSULE ORAL at 21:38

## 2019-09-05 RX ADMIN — MEROPENEM 1 MLS/HR: 1 INJECTION, POWDER, FOR SOLUTION INTRAVENOUS at 09:15

## 2019-09-06 VITALS — HEART RATE: 69 BPM | DIASTOLIC BLOOD PRESSURE: 58 MMHG | SYSTOLIC BLOOD PRESSURE: 115 MMHG | RESPIRATION RATE: 18 BRPM

## 2019-09-06 VITALS — DIASTOLIC BLOOD PRESSURE: 64 MMHG | SYSTOLIC BLOOD PRESSURE: 126 MMHG | RESPIRATION RATE: 18 BRPM | HEART RATE: 73 BPM

## 2019-09-06 VITALS — SYSTOLIC BLOOD PRESSURE: 146 MMHG | HEART RATE: 74 BPM | DIASTOLIC BLOOD PRESSURE: 70 MMHG | RESPIRATION RATE: 22 BRPM

## 2019-09-06 VITALS — SYSTOLIC BLOOD PRESSURE: 125 MMHG | RESPIRATION RATE: 18 BRPM | DIASTOLIC BLOOD PRESSURE: 64 MMHG | HEART RATE: 66 BPM

## 2019-09-06 VITALS — HEART RATE: 78 BPM | DIASTOLIC BLOOD PRESSURE: 69 MMHG | RESPIRATION RATE: 19 BRPM | SYSTOLIC BLOOD PRESSURE: 131 MMHG

## 2019-09-06 VITALS — DIASTOLIC BLOOD PRESSURE: 76 MMHG | HEART RATE: 76 BPM | SYSTOLIC BLOOD PRESSURE: 116 MMHG | RESPIRATION RATE: 18 BRPM

## 2019-09-06 LAB
ADD MAN DIFF?: NO
ALBUMIN: 3.4 G/DL (ref 3.3–4.9)
ANION GAP: 6 (ref 5–13)
BASOPHIL #: 0.1 10^3/UL (ref 0–0.1)
BASOPHILS %: 0.5 % (ref 0–2)
BLOOD UREA NITROGEN: 22 MG/DL (ref 7–20)
CALCIUM: 9.1 MG/DL (ref 8.4–10.2)
CARBON DIOXIDE: 26 MMOL/L (ref 21–31)
CHLORIDE: 108 MMOL/L (ref 97–110)
CREATININE: 1.04 MG/DL (ref 0.61–1.24)
EOSINOPHILS #: 0.4 10^3/UL (ref 0–0.5)
EOSINOPHILS %: 4.5 % (ref 0–7)
GLUCOSE: 116 MG/DL (ref 70–220)
HEMATOCRIT: 41.6 % (ref 42–52)
HEMOGLOBIN: 13.4 G/DL (ref 14–18)
IMMATURE GRANS #M: 0.05 10^3/UL (ref 0–0.03)
IMMATURE GRANS % (M): 0.5 % (ref 0–0.43)
LYMPHOCYTES #: 1.3 10^3/UL (ref 0.8–2.9)
LYMPHOCYTES %: 14.3 % (ref 15–51)
MAGNESIUM: 2.1 MG/DL (ref 1.7–2.5)
MEAN CORPUSCULAR HEMOGLOBIN: 29.7 PG (ref 29–33)
MEAN CORPUSCULAR HGB CONC: 32.2 G/DL (ref 32–37)
MEAN CORPUSCULAR VOLUME: 92.2 FL (ref 82–101)
MEAN PLATELET VOLUME: 9.3 FL (ref 7.4–10.4)
MONOCYTE #: 0.8 10^3/UL (ref 0.3–0.9)
MONOCYTES %: 8.9 % (ref 0–11)
NEUTROPHIL #: 6.5 10^3/UL (ref 1.6–7.5)
NEUTROPHILS %: 71.3 % (ref 39–77)
NUCLEATED RED BLOOD CELLS #: 0 10^3/UL (ref 0–0)
NUCLEATED RED BLOOD CELLS%: 0 /100WBC (ref 0–0)
PHOSPHORUS: 3 MG/DL (ref 2.5–4.9)
PLATELET COUNT: 216 10^3/UL (ref 140–415)
POTASSIUM: 4 MMOL/L (ref 3.5–5.1)
RED BLOOD COUNT: 4.51 10^6/UL (ref 4.7–6.1)
RED CELL DISTRIBUTION WIDTH: 12.6 % (ref 11.5–14.5)
SODIUM: 140 MMOL/L (ref 135–144)
WHITE BLOOD COUNT: 9.2 10^3/UL (ref 4.8–10.8)

## 2019-09-06 RX ADMIN — METOPROLOL SUCCINATE 1 MG: 100 TABLET, EXTENDED RELEASE ORAL at 08:37

## 2019-09-06 RX ADMIN — ATORVASTATIN CALCIUM 1 MG: 10 TABLET, FILM COATED ORAL at 20:13

## 2019-09-06 RX ADMIN — VITAMIN D, TAB 1000IU (100/BT) 1 UNIT: 25 TAB at 08:36

## 2019-09-06 RX ADMIN — PIPERACILLIN SODIUM AND TAZOBACTAM SODIUM SCH MLS/HR: 3; .375 INJECTION, POWDER, LYOPHILIZED, FOR SOLUTION INTRAVENOUS at 02:41

## 2019-09-06 RX ADMIN — ATORVASTATIN CALCIUM SCH MG: 10 TABLET, FILM COATED ORAL at 20:13

## 2019-09-06 RX ADMIN — EZETIMIBE SCH MG: 10 TABLET ORAL at 20:14

## 2019-09-06 RX ADMIN — ACETAMINOPHEN 1 MG: 325 TABLET, FILM COATED ORAL at 20:14

## 2019-09-06 RX ADMIN — AMLODIPINE BESYLATE 1 MG: 10 TABLET ORAL at 08:37

## 2019-09-06 RX ADMIN — PIPERACILLIN SODIUM AND TAZOBACTAM SODIUM 1 MLS/HR: 3; .375 INJECTION, POWDER, LYOPHILIZED, FOR SOLUTION INTRAVENOUS at 18:03

## 2019-09-06 RX ADMIN — METOPROLOL SUCCINATE SCH MG: 100 TABLET, FILM COATED, EXTENDED RELEASE ORAL at 08:37

## 2019-09-06 RX ADMIN — TAMSULOSIN HYDROCHLORIDE SCH MG: 0.4 CAPSULE ORAL at 20:14

## 2019-09-06 RX ADMIN — GABAPENTIN 1 MG: 100 CAPSULE ORAL at 20:14

## 2019-09-06 RX ADMIN — ACETAMINOPHEN 1 MG: 325 TABLET, FILM COATED ORAL at 12:25

## 2019-09-06 RX ADMIN — PIPERACILLIN SODIUM AND TAZOBACTAM SODIUM 1 MLS/HR: 3; .375 INJECTION, POWDER, LYOPHILIZED, FOR SOLUTION INTRAVENOUS at 10:51

## 2019-09-06 RX ADMIN — ASPIRIN 1 MG: 81 TABLET, COATED ORAL at 08:36

## 2019-09-06 RX ADMIN — PIPERACILLIN SODIUM AND TAZOBACTAM SODIUM SCH MLS/HR: 3; .375 INJECTION, POWDER, LYOPHILIZED, FOR SOLUTION INTRAVENOUS at 18:03

## 2019-09-06 RX ADMIN — EZETIMIBE 1 MG: 10 TABLET ORAL at 20:14

## 2019-09-06 RX ADMIN — AMLODIPINE BESYLATE SCH MG: 10 TABLET ORAL at 08:37

## 2019-09-06 RX ADMIN — PIPERACILLIN SODIUM AND TAZOBACTAM SODIUM SCH MLS/HR: 3; .375 INJECTION, POWDER, LYOPHILIZED, FOR SOLUTION INTRAVENOUS at 10:51

## 2019-09-06 RX ADMIN — GABAPENTIN SCH MG: 100 CAPSULE ORAL at 08:36

## 2019-09-06 RX ADMIN — ASPIRIN SCH MG: 81 TABLET, COATED ORAL at 08:36

## 2019-09-06 RX ADMIN — VITAMIN D, TAB 1000IU (100/BT) SCH UNIT: 25 TAB at 08:36

## 2019-09-06 RX ADMIN — GABAPENTIN SCH MG: 100 CAPSULE ORAL at 20:14

## 2019-09-06 RX ADMIN — PIPERACILLIN SODIUM AND TAZOBACTAM SODIUM 1 MLS/HR: 3; .375 INJECTION, POWDER, LYOPHILIZED, FOR SOLUTION INTRAVENOUS at 02:41

## 2019-09-06 RX ADMIN — GABAPENTIN 1 MG: 100 CAPSULE ORAL at 08:36

## 2019-09-06 RX ADMIN — TAMSULOSIN HYDROCHLORIDE 1 MG: 0.4 CAPSULE ORAL at 20:14

## 2019-09-07 VITALS — SYSTOLIC BLOOD PRESSURE: 134 MMHG | DIASTOLIC BLOOD PRESSURE: 65 MMHG | HEART RATE: 71 BPM | RESPIRATION RATE: 20 BRPM

## 2019-09-07 VITALS — RESPIRATION RATE: 20 BRPM | DIASTOLIC BLOOD PRESSURE: 69 MMHG | SYSTOLIC BLOOD PRESSURE: 114 MMHG | HEART RATE: 80 BPM

## 2019-09-07 VITALS — RESPIRATION RATE: 18 BRPM | SYSTOLIC BLOOD PRESSURE: 111 MMHG | DIASTOLIC BLOOD PRESSURE: 64 MMHG | HEART RATE: 64 BPM

## 2019-09-07 VITALS — HEART RATE: 68 BPM | RESPIRATION RATE: 20 BRPM | DIASTOLIC BLOOD PRESSURE: 68 MMHG | SYSTOLIC BLOOD PRESSURE: 144 MMHG

## 2019-09-07 VITALS — RESPIRATION RATE: 18 BRPM | HEART RATE: 84 BPM | DIASTOLIC BLOOD PRESSURE: 65 MMHG | SYSTOLIC BLOOD PRESSURE: 122 MMHG

## 2019-09-07 VITALS — RESPIRATION RATE: 18 BRPM | HEART RATE: 78 BPM | SYSTOLIC BLOOD PRESSURE: 118 MMHG | DIASTOLIC BLOOD PRESSURE: 78 MMHG

## 2019-09-07 RX ADMIN — ATORVASTATIN CALCIUM 1 MG: 10 TABLET, FILM COATED ORAL at 20:05

## 2019-09-07 RX ADMIN — GABAPENTIN SCH MG: 100 CAPSULE ORAL at 20:05

## 2019-09-07 RX ADMIN — MELATONIN TAB 3 MG SCH MG: 3 TAB at 21:00

## 2019-09-07 RX ADMIN — PIPERACILLIN SODIUM AND TAZOBACTAM SODIUM SCH MLS/HR: 3; .375 INJECTION, POWDER, LYOPHILIZED, FOR SOLUTION INTRAVENOUS at 18:18

## 2019-09-07 RX ADMIN — GABAPENTIN 1 MG: 100 CAPSULE ORAL at 08:21

## 2019-09-07 RX ADMIN — ASPIRIN 1 MG: 81 TABLET, COATED ORAL at 08:21

## 2019-09-07 RX ADMIN — GABAPENTIN SCH MG: 100 CAPSULE ORAL at 08:21

## 2019-09-07 RX ADMIN — ASPIRIN SCH MG: 81 TABLET, COATED ORAL at 08:21

## 2019-09-07 RX ADMIN — PIPERACILLIN SODIUM AND TAZOBACTAM SODIUM SCH MLS/HR: 3; .375 INJECTION, POWDER, LYOPHILIZED, FOR SOLUTION INTRAVENOUS at 01:32

## 2019-09-07 RX ADMIN — TAMSULOSIN HYDROCHLORIDE SCH MG: 0.4 CAPSULE ORAL at 20:05

## 2019-09-07 RX ADMIN — VITAMIN D, TAB 1000IU (100/BT) 1 UNIT: 25 TAB at 08:21

## 2019-09-07 RX ADMIN — PIPERACILLIN SODIUM AND TAZOBACTAM SODIUM 1 MLS/HR: 3; .375 INJECTION, POWDER, LYOPHILIZED, FOR SOLUTION INTRAVENOUS at 01:32

## 2019-09-07 RX ADMIN — PIPERACILLIN SODIUM AND TAZOBACTAM SODIUM 1 MLS/HR: 3; .375 INJECTION, POWDER, LYOPHILIZED, FOR SOLUTION INTRAVENOUS at 10:18

## 2019-09-07 RX ADMIN — PIPERACILLIN SODIUM AND TAZOBACTAM SODIUM 1 MLS/HR: 3; .375 INJECTION, POWDER, LYOPHILIZED, FOR SOLUTION INTRAVENOUS at 18:18

## 2019-09-07 RX ADMIN — PIPERACILLIN SODIUM AND TAZOBACTAM SODIUM SCH MLS/HR: 3; .375 INJECTION, POWDER, LYOPHILIZED, FOR SOLUTION INTRAVENOUS at 10:18

## 2019-09-07 RX ADMIN — EZETIMIBE 1 MG: 10 TABLET ORAL at 20:05

## 2019-09-07 RX ADMIN — MELATONIN TAB 3 MG 1 MG: 3 TAB at 21:00

## 2019-09-07 RX ADMIN — METOPROLOL SUCCINATE 1 MG: 100 TABLET, EXTENDED RELEASE ORAL at 08:22

## 2019-09-07 RX ADMIN — ATORVASTATIN CALCIUM SCH MG: 10 TABLET, FILM COATED ORAL at 20:05

## 2019-09-07 RX ADMIN — TAMSULOSIN HYDROCHLORIDE 1 MG: 0.4 CAPSULE ORAL at 20:05

## 2019-09-07 RX ADMIN — METOPROLOL SUCCINATE SCH MG: 100 TABLET, FILM COATED, EXTENDED RELEASE ORAL at 08:22

## 2019-09-07 RX ADMIN — AMLODIPINE BESYLATE SCH MG: 10 TABLET ORAL at 08:21

## 2019-09-07 RX ADMIN — AMLODIPINE BESYLATE 1 MG: 10 TABLET ORAL at 08:21

## 2019-09-07 RX ADMIN — ACETAMINOPHEN 1 MG: 325 TABLET, FILM COATED ORAL at 20:05

## 2019-09-07 RX ADMIN — GABAPENTIN 1 MG: 100 CAPSULE ORAL at 20:05

## 2019-09-07 RX ADMIN — EZETIMIBE SCH MG: 10 TABLET ORAL at 20:05

## 2019-09-07 RX ADMIN — VITAMIN D, TAB 1000IU (100/BT) SCH UNIT: 25 TAB at 08:21

## 2019-09-08 VITALS — HEART RATE: 68 BPM | DIASTOLIC BLOOD PRESSURE: 72 MMHG | SYSTOLIC BLOOD PRESSURE: 133 MMHG | RESPIRATION RATE: 18 BRPM

## 2019-09-08 VITALS — SYSTOLIC BLOOD PRESSURE: 124 MMHG | HEART RATE: 59 BPM | DIASTOLIC BLOOD PRESSURE: 66 MMHG | RESPIRATION RATE: 19 BRPM

## 2019-09-08 VITALS — RESPIRATION RATE: 20 BRPM | HEART RATE: 63 BPM | DIASTOLIC BLOOD PRESSURE: 77 MMHG | SYSTOLIC BLOOD PRESSURE: 143 MMHG

## 2019-09-08 VITALS — RESPIRATION RATE: 18 BRPM | HEART RATE: 66 BPM | DIASTOLIC BLOOD PRESSURE: 66 MMHG | SYSTOLIC BLOOD PRESSURE: 123 MMHG

## 2019-09-08 VITALS — DIASTOLIC BLOOD PRESSURE: 64 MMHG | HEART RATE: 63 BPM | SYSTOLIC BLOOD PRESSURE: 134 MMHG | RESPIRATION RATE: 18 BRPM

## 2019-09-08 VITALS — SYSTOLIC BLOOD PRESSURE: 131 MMHG | DIASTOLIC BLOOD PRESSURE: 73 MMHG | RESPIRATION RATE: 22 BRPM | HEART RATE: 63 BPM

## 2019-09-08 RX ADMIN — VITAMIN D, TAB 1000IU (100/BT) SCH UNIT: 25 TAB at 08:44

## 2019-09-08 RX ADMIN — LEVOFLOXACIN 1 MG: 500 TABLET, FILM COATED ORAL at 12:07

## 2019-09-08 RX ADMIN — GABAPENTIN 1 MG: 100 CAPSULE ORAL at 21:08

## 2019-09-08 RX ADMIN — LEVOFLOXACIN SCH MG: 500 TABLET, FILM COATED ORAL at 12:07

## 2019-09-08 RX ADMIN — MELATONIN TAB 3 MG 1 MG: 3 TAB at 21:08

## 2019-09-08 RX ADMIN — EZETIMIBE SCH MG: 10 TABLET ORAL at 21:08

## 2019-09-08 RX ADMIN — PIPERACILLIN SODIUM AND TAZOBACTAM SODIUM 1 MLS/HR: 3; .375 INJECTION, POWDER, LYOPHILIZED, FOR SOLUTION INTRAVENOUS at 02:47

## 2019-09-08 RX ADMIN — AMLODIPINE BESYLATE SCH MG: 10 TABLET ORAL at 08:45

## 2019-09-08 RX ADMIN — PIPERACILLIN SODIUM AND TAZOBACTAM SODIUM SCH MLS/HR: 3; .375 INJECTION, POWDER, LYOPHILIZED, FOR SOLUTION INTRAVENOUS at 02:47

## 2019-09-08 RX ADMIN — ATORVASTATIN CALCIUM SCH MG: 10 TABLET, FILM COATED ORAL at 21:08

## 2019-09-08 RX ADMIN — METOPROLOL SUCCINATE 1 MG: 100 TABLET, EXTENDED RELEASE ORAL at 08:45

## 2019-09-08 RX ADMIN — PIPERACILLIN SODIUM AND TAZOBACTAM SODIUM 1 MLS/HR: 3; .375 INJECTION, POWDER, LYOPHILIZED, FOR SOLUTION INTRAVENOUS at 10:17

## 2019-09-08 RX ADMIN — GABAPENTIN SCH MG: 100 CAPSULE ORAL at 21:08

## 2019-09-08 RX ADMIN — EZETIMIBE 1 MG: 10 TABLET ORAL at 21:08

## 2019-09-08 RX ADMIN — PIPERACILLIN SODIUM AND TAZOBACTAM SODIUM SCH MLS/HR: 3; .375 INJECTION, POWDER, LYOPHILIZED, FOR SOLUTION INTRAVENOUS at 10:17

## 2019-09-08 RX ADMIN — GABAPENTIN 1 MG: 100 CAPSULE ORAL at 08:44

## 2019-09-08 RX ADMIN — ASPIRIN 1 MG: 81 TABLET, COATED ORAL at 08:44

## 2019-09-08 RX ADMIN — METOPROLOL SUCCINATE SCH MG: 100 TABLET, FILM COATED, EXTENDED RELEASE ORAL at 08:45

## 2019-09-08 RX ADMIN — VITAMIN D, TAB 1000IU (100/BT) 1 UNIT: 25 TAB at 08:44

## 2019-09-08 RX ADMIN — GABAPENTIN SCH MG: 100 CAPSULE ORAL at 08:44

## 2019-09-08 RX ADMIN — TAMSULOSIN HYDROCHLORIDE SCH MG: 0.4 CAPSULE ORAL at 21:08

## 2019-09-08 RX ADMIN — MELATONIN TAB 3 MG SCH MG: 3 TAB at 21:08

## 2019-09-08 RX ADMIN — ACETAMINOPHEN 1 MG: 325 TABLET, FILM COATED ORAL at 05:05

## 2019-09-08 RX ADMIN — TAMSULOSIN HYDROCHLORIDE 1 MG: 0.4 CAPSULE ORAL at 21:08

## 2019-09-08 RX ADMIN — ACETAMINOPHEN 1 MG: 325 TABLET, FILM COATED ORAL at 17:06

## 2019-09-08 RX ADMIN — AMLODIPINE BESYLATE 1 MG: 10 TABLET ORAL at 08:45

## 2019-09-08 RX ADMIN — ATORVASTATIN CALCIUM 1 MG: 10 TABLET, FILM COATED ORAL at 21:08

## 2019-09-08 RX ADMIN — ASPIRIN SCH MG: 81 TABLET, COATED ORAL at 08:44

## 2019-09-09 VITALS — SYSTOLIC BLOOD PRESSURE: 131 MMHG | HEART RATE: 71 BPM | RESPIRATION RATE: 21 BRPM | DIASTOLIC BLOOD PRESSURE: 73 MMHG

## 2019-09-09 VITALS — DIASTOLIC BLOOD PRESSURE: 81 MMHG | HEART RATE: 84 BPM | RESPIRATION RATE: 20 BRPM | SYSTOLIC BLOOD PRESSURE: 130 MMHG

## 2019-09-09 VITALS — SYSTOLIC BLOOD PRESSURE: 128 MMHG | HEART RATE: 87 BPM | RESPIRATION RATE: 20 BRPM | DIASTOLIC BLOOD PRESSURE: 34 MMHG

## 2019-09-09 VITALS — DIASTOLIC BLOOD PRESSURE: 70 MMHG | RESPIRATION RATE: 20 BRPM | SYSTOLIC BLOOD PRESSURE: 126 MMHG | HEART RATE: 79 BPM

## 2019-09-09 VITALS — SYSTOLIC BLOOD PRESSURE: 130 MMHG | DIASTOLIC BLOOD PRESSURE: 70 MMHG | RESPIRATION RATE: 20 BRPM | HEART RATE: 71 BPM

## 2019-09-09 VITALS — SYSTOLIC BLOOD PRESSURE: 125 MMHG | DIASTOLIC BLOOD PRESSURE: 68 MMHG | HEART RATE: 76 BPM | RESPIRATION RATE: 20 BRPM

## 2019-09-09 RX ADMIN — AMLODIPINE BESYLATE SCH MG: 10 TABLET ORAL at 08:33

## 2019-09-09 RX ADMIN — ATORVASTATIN CALCIUM SCH MG: 10 TABLET, FILM COATED ORAL at 20:47

## 2019-09-09 RX ADMIN — LEVOFLOXACIN 1 MG: 500 TABLET, FILM COATED ORAL at 06:11

## 2019-09-09 RX ADMIN — VITAMIN D, TAB 1000IU (100/BT) 1 UNIT: 25 TAB at 08:32

## 2019-09-09 RX ADMIN — TAMSULOSIN HYDROCHLORIDE 1 MG: 0.4 CAPSULE ORAL at 20:47

## 2019-09-09 RX ADMIN — GABAPENTIN SCH MG: 100 CAPSULE ORAL at 08:33

## 2019-09-09 RX ADMIN — ASPIRIN SCH MG: 81 TABLET, COATED ORAL at 08:33

## 2019-09-09 RX ADMIN — EZETIMIBE 1 MG: 10 TABLET ORAL at 20:47

## 2019-09-09 RX ADMIN — EZETIMIBE SCH MG: 10 TABLET ORAL at 20:47

## 2019-09-09 RX ADMIN — TAMSULOSIN HYDROCHLORIDE SCH MG: 0.4 CAPSULE ORAL at 20:47

## 2019-09-09 RX ADMIN — GABAPENTIN SCH MG: 100 CAPSULE ORAL at 20:47

## 2019-09-09 RX ADMIN — AMLODIPINE BESYLATE 1 MG: 10 TABLET ORAL at 08:33

## 2019-09-09 RX ADMIN — GABAPENTIN 1 MG: 100 CAPSULE ORAL at 20:47

## 2019-09-09 RX ADMIN — MELATONIN TAB 3 MG 1 MG: 3 TAB at 20:47

## 2019-09-09 RX ADMIN — GABAPENTIN 1 MG: 100 CAPSULE ORAL at 08:33

## 2019-09-09 RX ADMIN — METOPROLOL SUCCINATE 1 MG: 100 TABLET, EXTENDED RELEASE ORAL at 08:33

## 2019-09-09 RX ADMIN — LEVOFLOXACIN SCH MG: 500 TABLET, FILM COATED ORAL at 06:11

## 2019-09-09 RX ADMIN — ASPIRIN 1 MG: 81 TABLET, COATED ORAL at 08:33

## 2019-09-09 RX ADMIN — METOPROLOL SUCCINATE SCH MG: 100 TABLET, FILM COATED, EXTENDED RELEASE ORAL at 08:33

## 2019-09-09 RX ADMIN — MELATONIN TAB 3 MG SCH MG: 3 TAB at 20:47

## 2019-09-09 RX ADMIN — VITAMIN D, TAB 1000IU (100/BT) SCH UNIT: 25 TAB at 08:32

## 2019-09-09 RX ADMIN — ATORVASTATIN CALCIUM 1 MG: 10 TABLET, FILM COATED ORAL at 20:47

## 2019-09-09 RX ADMIN — ACETAMINOPHEN 1 MG: 325 TABLET, FILM COATED ORAL at 20:47

## 2019-09-10 VITALS — HEART RATE: 77 BPM | SYSTOLIC BLOOD PRESSURE: 117 MMHG | RESPIRATION RATE: 18 BRPM | DIASTOLIC BLOOD PRESSURE: 61 MMHG

## 2019-09-10 VITALS — SYSTOLIC BLOOD PRESSURE: 138 MMHG | DIASTOLIC BLOOD PRESSURE: 70 MMHG | HEART RATE: 76 BPM | RESPIRATION RATE: 18 BRPM

## 2019-09-10 VITALS — HEART RATE: 68 BPM | SYSTOLIC BLOOD PRESSURE: 123 MMHG | DIASTOLIC BLOOD PRESSURE: 70 MMHG | RESPIRATION RATE: 18 BRPM

## 2019-09-10 VITALS — RESPIRATION RATE: 18 BRPM | DIASTOLIC BLOOD PRESSURE: 64 MMHG | HEART RATE: 68 BPM | SYSTOLIC BLOOD PRESSURE: 129 MMHG

## 2019-09-10 VITALS — DIASTOLIC BLOOD PRESSURE: 65 MMHG | HEART RATE: 81 BPM | RESPIRATION RATE: 16 BRPM | SYSTOLIC BLOOD PRESSURE: 111 MMHG

## 2019-09-10 VITALS — SYSTOLIC BLOOD PRESSURE: 116 MMHG | HEART RATE: 85 BPM | RESPIRATION RATE: 20 BRPM | DIASTOLIC BLOOD PRESSURE: 70 MMHG

## 2019-09-10 VITALS — HEART RATE: 95 BPM | DIASTOLIC BLOOD PRESSURE: 78 MMHG | SYSTOLIC BLOOD PRESSURE: 130 MMHG | RESPIRATION RATE: 16 BRPM

## 2019-09-10 VITALS — DIASTOLIC BLOOD PRESSURE: 51 MMHG | SYSTOLIC BLOOD PRESSURE: 104 MMHG | RESPIRATION RATE: 20 BRPM | HEART RATE: 72 BPM

## 2019-09-10 RX ADMIN — GABAPENTIN 1 MG: 100 CAPSULE ORAL at 20:58

## 2019-09-10 RX ADMIN — METOPROLOL SUCCINATE SCH MG: 100 TABLET, FILM COATED, EXTENDED RELEASE ORAL at 08:35

## 2019-09-10 RX ADMIN — GABAPENTIN 1 MG: 100 CAPSULE ORAL at 08:35

## 2019-09-10 RX ADMIN — EZETIMIBE SCH MG: 10 TABLET ORAL at 20:58

## 2019-09-10 RX ADMIN — MELATONIN TAB 3 MG 1 MG: 3 TAB at 20:57

## 2019-09-10 RX ADMIN — LEVOFLOXACIN 1 MG: 500 TABLET, FILM COATED ORAL at 05:48

## 2019-09-10 RX ADMIN — ASPIRIN 1 MG: 81 TABLET, COATED ORAL at 08:34

## 2019-09-10 RX ADMIN — LEVOFLOXACIN SCH MG: 500 TABLET, FILM COATED ORAL at 05:48

## 2019-09-10 RX ADMIN — MELATONIN TAB 3 MG SCH MG: 3 TAB at 20:57

## 2019-09-10 RX ADMIN — GABAPENTIN SCH MG: 100 CAPSULE ORAL at 08:35

## 2019-09-10 RX ADMIN — AMLODIPINE BESYLATE SCH MG: 10 TABLET ORAL at 08:34

## 2019-09-10 RX ADMIN — ATORVASTATIN CALCIUM 1 MG: 10 TABLET, FILM COATED ORAL at 20:58

## 2019-09-10 RX ADMIN — ENOXAPARIN SODIUM SCH MG: 100 INJECTION SUBCUTANEOUS at 08:42

## 2019-09-10 RX ADMIN — TAMSULOSIN HYDROCHLORIDE SCH MG: 0.4 CAPSULE ORAL at 20:58

## 2019-09-10 RX ADMIN — VITAMIN D, TAB 1000IU (100/BT) SCH UNIT: 25 TAB at 08:34

## 2019-09-10 RX ADMIN — ENOXAPARIN SODIUM 1 MG: 100 INJECTION SUBCUTANEOUS at 08:42

## 2019-09-10 RX ADMIN — ATORVASTATIN CALCIUM SCH MG: 10 TABLET, FILM COATED ORAL at 20:58

## 2019-09-10 RX ADMIN — VITAMIN D, TAB 1000IU (100/BT) 1 UNIT: 25 TAB at 08:34

## 2019-09-10 RX ADMIN — EZETIMIBE 1 MG: 10 TABLET ORAL at 20:58

## 2019-09-10 RX ADMIN — AMLODIPINE BESYLATE 1 MG: 10 TABLET ORAL at 08:34

## 2019-09-10 RX ADMIN — GABAPENTIN SCH MG: 100 CAPSULE ORAL at 20:58

## 2019-09-10 RX ADMIN — METOPROLOL SUCCINATE 1 MG: 100 TABLET, EXTENDED RELEASE ORAL at 08:35

## 2019-09-10 RX ADMIN — ASPIRIN SCH MG: 81 TABLET, COATED ORAL at 08:34

## 2019-09-10 RX ADMIN — TAMSULOSIN HYDROCHLORIDE 1 MG: 0.4 CAPSULE ORAL at 20:58

## 2019-09-11 VITALS — SYSTOLIC BLOOD PRESSURE: 109 MMHG | DIASTOLIC BLOOD PRESSURE: 59 MMHG | HEART RATE: 72 BPM | RESPIRATION RATE: 17 BRPM

## 2019-09-11 VITALS — RESPIRATION RATE: 18 BRPM | SYSTOLIC BLOOD PRESSURE: 126 MMHG | HEART RATE: 73 BPM | DIASTOLIC BLOOD PRESSURE: 69 MMHG

## 2019-09-11 VITALS — HEART RATE: 71 BPM | DIASTOLIC BLOOD PRESSURE: 66 MMHG | RESPIRATION RATE: 18 BRPM | SYSTOLIC BLOOD PRESSURE: 122 MMHG

## 2019-09-11 VITALS — SYSTOLIC BLOOD PRESSURE: 106 MMHG | RESPIRATION RATE: 18 BRPM | DIASTOLIC BLOOD PRESSURE: 62 MMHG | HEART RATE: 75 BPM

## 2019-09-11 VITALS — RESPIRATION RATE: 18 BRPM | SYSTOLIC BLOOD PRESSURE: 115 MMHG | HEART RATE: 79 BPM | DIASTOLIC BLOOD PRESSURE: 62 MMHG

## 2019-09-11 VITALS — HEART RATE: 73 BPM | RESPIRATION RATE: 18 BRPM | SYSTOLIC BLOOD PRESSURE: 127 MMHG | DIASTOLIC BLOOD PRESSURE: 67 MMHG

## 2019-09-11 RX ADMIN — ENOXAPARIN SODIUM 1 MG: 100 INJECTION SUBCUTANEOUS at 08:52

## 2019-09-11 RX ADMIN — MELATONIN TAB 3 MG 1 MG: 3 TAB at 20:34

## 2019-09-11 RX ADMIN — GABAPENTIN SCH MG: 100 CAPSULE ORAL at 20:33

## 2019-09-11 RX ADMIN — GABAPENTIN 1 MG: 100 CAPSULE ORAL at 20:33

## 2019-09-11 RX ADMIN — MELATONIN TAB 3 MG SCH MG: 3 TAB at 20:34

## 2019-09-11 RX ADMIN — METOPROLOL SUCCINATE 1 MG: 100 TABLET, EXTENDED RELEASE ORAL at 08:32

## 2019-09-11 RX ADMIN — TAMSULOSIN HYDROCHLORIDE SCH MG: 0.4 CAPSULE ORAL at 20:34

## 2019-09-11 RX ADMIN — VITAMIN D, TAB 1000IU (100/BT) 1 UNIT: 25 TAB at 08:31

## 2019-09-11 RX ADMIN — ATORVASTATIN CALCIUM 1 MG: 10 TABLET, FILM COATED ORAL at 20:34

## 2019-09-11 RX ADMIN — ENOXAPARIN SODIUM SCH MG: 100 INJECTION SUBCUTANEOUS at 08:52

## 2019-09-11 RX ADMIN — TAMSULOSIN HYDROCHLORIDE 1 MG: 0.4 CAPSULE ORAL at 20:34

## 2019-09-11 RX ADMIN — VITAMIN D, TAB 1000IU (100/BT) SCH UNIT: 25 TAB at 08:31

## 2019-09-11 RX ADMIN — EZETIMIBE 1 MG: 10 TABLET ORAL at 20:33

## 2019-09-11 RX ADMIN — GABAPENTIN 1 MG: 100 CAPSULE ORAL at 08:31

## 2019-09-11 RX ADMIN — ATORVASTATIN CALCIUM SCH MG: 10 TABLET, FILM COATED ORAL at 20:34

## 2019-09-11 RX ADMIN — METOPROLOL SUCCINATE SCH MG: 100 TABLET, FILM COATED, EXTENDED RELEASE ORAL at 08:32

## 2019-09-11 RX ADMIN — ASPIRIN SCH MG: 81 TABLET, COATED ORAL at 08:32

## 2019-09-11 RX ADMIN — EZETIMIBE SCH MG: 10 TABLET ORAL at 20:33

## 2019-09-11 RX ADMIN — ASPIRIN 1 MG: 81 TABLET, COATED ORAL at 08:32

## 2019-09-11 RX ADMIN — AMLODIPINE BESYLATE 1 MG: 10 TABLET ORAL at 08:32

## 2019-09-11 RX ADMIN — AMLODIPINE BESYLATE SCH MG: 10 TABLET ORAL at 08:32

## 2019-09-11 RX ADMIN — GABAPENTIN SCH MG: 100 CAPSULE ORAL at 08:31

## 2019-09-12 VITALS — SYSTOLIC BLOOD PRESSURE: 125 MMHG | HEART RATE: 77 BPM | RESPIRATION RATE: 20 BRPM | DIASTOLIC BLOOD PRESSURE: 70 MMHG

## 2019-09-12 VITALS — DIASTOLIC BLOOD PRESSURE: 76 MMHG | RESPIRATION RATE: 20 BRPM | HEART RATE: 68 BPM | SYSTOLIC BLOOD PRESSURE: 120 MMHG

## 2019-09-12 VITALS — DIASTOLIC BLOOD PRESSURE: 62 MMHG | RESPIRATION RATE: 20 BRPM | HEART RATE: 75 BPM | SYSTOLIC BLOOD PRESSURE: 98 MMHG

## 2019-09-12 VITALS — RESPIRATION RATE: 22 BRPM | HEART RATE: 71 BPM | SYSTOLIC BLOOD PRESSURE: 136 MMHG | DIASTOLIC BLOOD PRESSURE: 75 MMHG

## 2019-09-12 RX ADMIN — VITAMIN D, TAB 1000IU (100/BT) SCH UNIT: 25 TAB at 08:38

## 2019-09-12 RX ADMIN — GABAPENTIN 1 MG: 100 CAPSULE ORAL at 08:38

## 2019-09-12 RX ADMIN — AMLODIPINE BESYLATE 1 MG: 10 TABLET ORAL at 08:38

## 2019-09-12 RX ADMIN — METOPROLOL SUCCINATE SCH MG: 100 TABLET, FILM COATED, EXTENDED RELEASE ORAL at 08:38

## 2019-09-12 RX ADMIN — VITAMIN D, TAB 1000IU (100/BT) 1 UNIT: 25 TAB at 08:38

## 2019-09-12 RX ADMIN — GABAPENTIN SCH MG: 100 CAPSULE ORAL at 08:38

## 2019-09-12 RX ADMIN — AMLODIPINE BESYLATE SCH MG: 10 TABLET ORAL at 08:38

## 2019-09-12 RX ADMIN — ASPIRIN 1 MG: 81 TABLET, COATED ORAL at 08:38

## 2019-09-12 RX ADMIN — ASPIRIN SCH MG: 81 TABLET, COATED ORAL at 08:38

## 2019-09-12 RX ADMIN — ENOXAPARIN SODIUM 1 MG: 100 INJECTION SUBCUTANEOUS at 08:41

## 2019-09-12 RX ADMIN — METOPROLOL SUCCINATE 1 MG: 100 TABLET, EXTENDED RELEASE ORAL at 08:38

## 2019-09-12 RX ADMIN — ENOXAPARIN SODIUM SCH MG: 100 INJECTION SUBCUTANEOUS at 08:41
